# Patient Record
Sex: FEMALE | Race: WHITE | NOT HISPANIC OR LATINO | URBAN - METROPOLITAN AREA
[De-identification: names, ages, dates, MRNs, and addresses within clinical notes are randomized per-mention and may not be internally consistent; named-entity substitution may affect disease eponyms.]

---

## 2017-02-02 ENCOUNTER — COMMUNICATION - HEALTHEAST (OUTPATIENT)
Dept: FAMILY MEDICINE | Facility: CLINIC | Age: 82
End: 2017-02-02

## 2017-02-02 DIAGNOSIS — Z00.00 HEALTH CARE MAINTENANCE: ICD-10-CM

## 2017-02-02 DIAGNOSIS — I63.9 STROKE (H): ICD-10-CM

## 2017-08-14 ENCOUNTER — COMMUNICATION - HEALTHEAST (OUTPATIENT)
Dept: FAMILY MEDICINE | Facility: CLINIC | Age: 82
End: 2017-08-14

## 2017-08-15 ENCOUNTER — AMBULATORY - HEALTHEAST (OUTPATIENT)
Dept: FAMILY MEDICINE | Facility: CLINIC | Age: 82
End: 2017-08-15

## 2017-08-15 DIAGNOSIS — Z00.00 HEALTH CARE MAINTENANCE: ICD-10-CM

## 2017-08-15 DIAGNOSIS — I63.9 STROKE (H): ICD-10-CM

## 2017-08-16 ENCOUNTER — COMMUNICATION - HEALTHEAST (OUTPATIENT)
Dept: SCHEDULING | Facility: CLINIC | Age: 82
End: 2017-08-16

## 2017-08-16 DIAGNOSIS — I63.9 STROKE (H): ICD-10-CM

## 2017-08-17 ENCOUNTER — COMMUNICATION - HEALTHEAST (OUTPATIENT)
Dept: SCHEDULING | Facility: CLINIC | Age: 82
End: 2017-08-17

## 2017-08-17 DIAGNOSIS — I63.9 STROKE (H): ICD-10-CM

## 2017-08-21 ENCOUNTER — RECORDS - HEALTHEAST (OUTPATIENT)
Dept: ADMINISTRATIVE | Facility: OTHER | Age: 82
End: 2017-08-21

## 2017-09-26 ENCOUNTER — OFFICE VISIT - HEALTHEAST (OUTPATIENT)
Dept: SURGERY | Facility: CLINIC | Age: 82
End: 2017-09-26

## 2017-09-26 DIAGNOSIS — Z85.3 PERSONAL HISTORY OF BREAST CANCER: ICD-10-CM

## 2017-09-26 ASSESSMENT — MIFFLIN-ST. JEOR: SCORE: 869.89

## 2018-02-09 ENCOUNTER — COMMUNICATION - HEALTHEAST (OUTPATIENT)
Dept: SCHEDULING | Facility: CLINIC | Age: 83
End: 2018-02-09

## 2018-02-09 DIAGNOSIS — I63.9 STROKE (H): ICD-10-CM

## 2018-05-01 ENCOUNTER — COMMUNICATION - HEALTHEAST (OUTPATIENT)
Dept: FAMILY MEDICINE | Facility: CLINIC | Age: 83
End: 2018-05-01

## 2018-05-01 ENCOUNTER — OFFICE VISIT - HEALTHEAST (OUTPATIENT)
Dept: FAMILY MEDICINE | Facility: CLINIC | Age: 83
End: 2018-05-01

## 2018-05-01 DIAGNOSIS — I63.9 STROKE (H): ICD-10-CM

## 2018-05-01 DIAGNOSIS — R01.1 HEART MURMUR: ICD-10-CM

## 2018-05-01 LAB
ANION GAP SERPL CALCULATED.3IONS-SCNC: 11 MMOL/L (ref 5–18)
BUN SERPL-MCNC: 24 MG/DL (ref 8–28)
CALCIUM SERPL-MCNC: 9.4 MG/DL (ref 8.5–10.5)
CHLORIDE BLD-SCNC: 106 MMOL/L (ref 98–107)
CO2 SERPL-SCNC: 23 MMOL/L (ref 22–31)
CREAT SERPL-MCNC: 0.9 MG/DL (ref 0.6–1.1)
GFR SERPL CREATININE-BSD FRML MDRD: 59 ML/MIN/1.73M2
GLUCOSE BLD-MCNC: 100 MG/DL (ref 70–125)
POTASSIUM BLD-SCNC: 4.3 MMOL/L (ref 3.5–5)
SODIUM SERPL-SCNC: 140 MMOL/L (ref 136–145)

## 2018-05-01 ASSESSMENT — MIFFLIN-ST. JEOR: SCORE: 889.39

## 2018-05-02 ENCOUNTER — AMBULATORY - HEALTHEAST (OUTPATIENT)
Dept: FAMILY MEDICINE | Facility: CLINIC | Age: 83
End: 2018-05-02

## 2018-05-02 DIAGNOSIS — I63.9 STROKE (H): ICD-10-CM

## 2018-06-13 ENCOUNTER — HOSPITAL ENCOUNTER (OUTPATIENT)
Dept: CARDIOLOGY | Facility: HOSPITAL | Age: 83
Discharge: HOME | End: 2018-06-13
Attending: FAMILY MEDICINE

## 2018-06-13 DIAGNOSIS — R01.1 HEART MURMUR: ICD-10-CM

## 2018-06-13 ASSESSMENT — MIFFLIN-ST. JEOR: SCORE: 888.03

## 2018-06-14 ENCOUNTER — COMMUNICATION - HEALTHEAST (OUTPATIENT)
Dept: FAMILY MEDICINE | Facility: CLINIC | Age: 83
End: 2018-06-14

## 2018-06-14 LAB
AORTIC ROOT: 2.7 CM
AORTIC VALVE MEAN VELOCITY: 231 CM/S
AV DIMENSIONLESS INDEX VTI: 0.2
AV MEAN GRADIENT: 23 MMHG
AV PEAK GRADIENT: 39.2 MMHG
AV VALVE AREA: 0.7 CM2
BSA FOR ECHO PROCEDURE: 1.54 M2
CV BLOOD PRESSURE: NORMAL MMHG
CV ECHO HEIGHT: 62 IN
CV ECHO WEIGHT: 119 LBS
DOP CALC AO PEAK VEL: 313 CM/S
DOP CALC AO VTI: 73.9 CM
DOP CALC LVOT AREA: 3.14 CM2
DOP CALC LVOT DIAMETER: 2 CM
DOP CALC LVOT STROKE VOLUME: 49.9 CM3
DOP CALC MV VTI: 40.6 CM
DOP CALCLVOT PEAK VEL VTI: 15.9 CM
EJECTION FRACTION: 75 % (ref 55–75)
FRACTIONAL SHORTENING: 28.1 % (ref 28–44)
INTERVENTRICULAR SEPTUM IN END DIASTOLE: 1.8 CM (ref 0.6–0.9)
IVS/PW RATIO: 1.8
LA AREA 1: 16.2 CM2
LA AREA 2: 14.4 CM2
LEFT ATRIUM LENGTH: 5.24 CM
LEFT ATRIUM SIZE: 4 CM
LEFT ATRIUM VOLUME INDEX: 24.6 ML/M2
LEFT ATRIUM VOLUME: 37.8 ML
LEFT VENTRICLE CARDIAC INDEX: 2.4 L/MIN/M2
LEFT VENTRICLE CARDIAC OUTPUT: 3.7 L/MIN
LEFT VENTRICLE DIASTOLIC VOLUME INDEX: 33.8 CM3/M2 (ref 34–74)
LEFT VENTRICLE DIASTOLIC VOLUME: 52 CM3 (ref 46–106)
LEFT VENTRICLE HEART RATE: 74 BPM
LEFT VENTRICLE MASS INDEX: 99.4 G/M2
LEFT VENTRICLE SYSTOLIC VOLUME INDEX: 8.4 CM3/M2 (ref 11–31)
LEFT VENTRICLE SYSTOLIC VOLUME: 13 CM3 (ref 14–42)
LEFT VENTRICULAR INTERNAL DIMENSION IN DIASTOLE: 3.2 CM (ref 3.8–5.2)
LEFT VENTRICULAR INTERNAL DIMENSION IN SYSTOLE: 2.3 CM (ref 2.2–3.5)
LEFT VENTRICULAR MASS: 153 G
LEFT VENTRICULAR OUTFLOW TRACT MEAN GRADIENT: 1 MMHG
LEFT VENTRICULAR OUTFLOW TRACT MEAN VELOCITY: 47.6 CM/S
LEFT VENTRICULAR POSTERIOR WALL IN END DIASTOLE: 1 CM (ref 0.6–0.9)
LV STROKE VOLUME INDEX: 32.4 ML/M2
MITRAL REGURGITANT VELOCITY TIME INTEGRAL: 108 CM
MITRAL VALVE DECELERATION SLOPE: 3750 MM/S2
MITRAL VALVE E/A RATIO: 0.7
MITRAL VALVE MEAN INFLOW VELOCITY: 111 CM/S
MITRAL VALVE PEAK VELOCITY: 175 CM/S
MITRAL VALVE PRESSURE HALF-TIME: 102 MS
MR MEAN GRADIENT: 99 MMHG
MR MEAN VELOCITY: 469 CM/S
MR PEAK GRADIENT: 155.3 MMHG
MR PISA RADIUS: 0.7 CM
MV AREA VTI: 1.23 CM2
MV AVERAGE E/E' RATIO: 25.2 CM/S
MV DECELERATION TIME: 320 MS
MV E'TISSUE VEL-LAT: 4.39 CM/S
MV E'TISSUE VEL-MED: 3.7 CM/S
MV LATERAL E/E' RATIO: 23.2
MV MEAN GRADIENT: 5 MMHG
MV MEDIAL E/E' RATIO: 27.6
MV PEAK A VELOCITY: 156 CM/S
MV PEAK E VELOCITY: 102 CM/S
MV PEAK GRADIENT: 12.3 MMHG
MV VALVE AREA BY CONTINUITY EQUATION: 1.2 CM2
MV VALVE AREA PRESSURE 1/2 METHOD: 2.2 CM2
NUC REST DIASTOLIC VOLUME INDEX: 1904 LBS
NUC REST SYSTOLIC VOLUME INDEX: 62 IN
PISA MR PEAK VEL: 623 CM/S
PR MAX PG: 6 MMHG
PR PEAK VELOCITY: 119 CM/S
TRICUSPID REGURGITATION PEAK PRESSURE GRADIENT: 30 MMHG
TRICUSPID VALVE ANULAR PLANE SYSTOLIC EXCURSION: 1.6 CM
TRICUSPID VALVE PEAK REGURGITANT VELOCITY: 274 CM/S

## 2018-06-15 ENCOUNTER — COMMUNICATION - HEALTHEAST (OUTPATIENT)
Dept: FAMILY MEDICINE | Facility: CLINIC | Age: 83
End: 2018-06-15

## 2018-08-06 ENCOUNTER — COMMUNICATION - HEALTHEAST (OUTPATIENT)
Dept: FAMILY MEDICINE | Facility: CLINIC | Age: 83
End: 2018-08-06

## 2018-09-07 ENCOUNTER — RECORDS - HEALTHEAST (OUTPATIENT)
Dept: ADMINISTRATIVE | Facility: OTHER | Age: 83
End: 2018-09-07

## 2018-09-10 ENCOUNTER — RECORDS - HEALTHEAST (OUTPATIENT)
Dept: ADMINISTRATIVE | Facility: OTHER | Age: 83
End: 2018-09-10

## 2018-09-11 ENCOUNTER — AMBULATORY - HEALTHEAST (OUTPATIENT)
Dept: ADMINISTRATIVE | Facility: CLINIC | Age: 83
End: 2018-09-11

## 2018-09-11 ENCOUNTER — OFFICE VISIT - HEALTHEAST (OUTPATIENT)
Dept: GERIATRICS | Facility: CLINIC | Age: 83
End: 2018-09-11

## 2018-09-11 DIAGNOSIS — I10 HYPERTENSION: ICD-10-CM

## 2018-09-11 DIAGNOSIS — D72.829 LEUKOCYTOSIS: ICD-10-CM

## 2018-09-11 DIAGNOSIS — K92.2 UPPER GI BLEED: ICD-10-CM

## 2018-09-11 DIAGNOSIS — D64.9 ANEMIA: ICD-10-CM

## 2018-09-11 RX ORDER — PANTOPRAZOLE SODIUM 40 MG/1
40 TABLET, DELAYED RELEASE ORAL
Status: SHIPPED | COMMUNITY
Start: 2018-09-11

## 2018-09-13 ENCOUNTER — OFFICE VISIT - HEALTHEAST (OUTPATIENT)
Dept: GERIATRICS | Facility: CLINIC | Age: 83
End: 2018-09-13

## 2018-09-13 ENCOUNTER — RECORDS - HEALTHEAST (OUTPATIENT)
Dept: LAB | Facility: CLINIC | Age: 83
End: 2018-09-13

## 2018-09-13 DIAGNOSIS — D64.9 ANEMIA: ICD-10-CM

## 2018-09-13 DIAGNOSIS — R53.1 GENERAL WEAKNESS: ICD-10-CM

## 2018-09-13 DIAGNOSIS — K25.9 GASTRIC ULCER: ICD-10-CM

## 2018-09-13 DIAGNOSIS — I10 HYPERTENSION: ICD-10-CM

## 2018-09-13 LAB — HGB BLD-MCNC: 10.1 G/DL (ref 12–16)

## 2018-09-17 ENCOUNTER — OFFICE VISIT - HEALTHEAST (OUTPATIENT)
Dept: GERIATRICS | Facility: CLINIC | Age: 83
End: 2018-09-17

## 2018-09-17 ENCOUNTER — RECORDS - HEALTHEAST (OUTPATIENT)
Dept: LAB | Facility: CLINIC | Age: 83
End: 2018-09-17

## 2018-09-17 DIAGNOSIS — R53.1 GENERAL WEAKNESS: ICD-10-CM

## 2018-09-17 DIAGNOSIS — D64.9 ANEMIA: ICD-10-CM

## 2018-09-17 DIAGNOSIS — K25.9 GASTRIC ULCER: ICD-10-CM

## 2018-09-17 DIAGNOSIS — I10 HYPERTENSION: ICD-10-CM

## 2018-09-17 LAB — HGB BLD-MCNC: 11.6 G/DL (ref 12–16)

## 2018-09-20 ENCOUNTER — OFFICE VISIT - HEALTHEAST (OUTPATIENT)
Dept: GERIATRICS | Facility: CLINIC | Age: 83
End: 2018-09-20

## 2018-09-20 DIAGNOSIS — I10 HYPERTENSION: ICD-10-CM

## 2018-09-20 DIAGNOSIS — R53.1 GENERAL WEAKNESS: ICD-10-CM

## 2018-09-20 DIAGNOSIS — D64.9 ANEMIA: ICD-10-CM

## 2018-09-20 DIAGNOSIS — K25.9 GASTRIC ULCER: ICD-10-CM

## 2018-09-24 ENCOUNTER — OFFICE VISIT - HEALTHEAST (OUTPATIENT)
Dept: GERIATRICS | Facility: CLINIC | Age: 83
End: 2018-09-24

## 2018-09-24 DIAGNOSIS — K25.9 GASTRIC ULCER: ICD-10-CM

## 2018-09-24 DIAGNOSIS — I10 HYPERTENSION: ICD-10-CM

## 2018-09-24 DIAGNOSIS — H40.9 GLAUCOMA: ICD-10-CM

## 2018-09-24 DIAGNOSIS — D64.9 ANEMIA: ICD-10-CM

## 2018-09-25 ENCOUNTER — COMMUNICATION - HEALTHEAST (OUTPATIENT)
Dept: FAMILY MEDICINE | Facility: CLINIC | Age: 83
End: 2018-09-25

## 2018-09-27 ENCOUNTER — COMMUNICATION - HEALTHEAST (OUTPATIENT)
Dept: GERIATRICS | Facility: CLINIC | Age: 83
End: 2018-09-27

## 2018-09-27 ENCOUNTER — AMBULATORY - HEALTHEAST (OUTPATIENT)
Dept: GERIATRICS | Facility: CLINIC | Age: 83
End: 2018-09-27

## 2018-10-10 ENCOUNTER — RECORDS - HEALTHEAST (OUTPATIENT)
Dept: ADMINISTRATIVE | Facility: OTHER | Age: 83
End: 2018-10-10

## 2018-10-11 ENCOUNTER — COMMUNICATION - HEALTHEAST (OUTPATIENT)
Dept: FAMILY MEDICINE | Facility: CLINIC | Age: 83
End: 2018-10-11

## 2018-10-12 ENCOUNTER — OFFICE VISIT - HEALTHEAST (OUTPATIENT)
Dept: FAMILY MEDICINE | Facility: CLINIC | Age: 83
End: 2018-10-12

## 2018-10-12 DIAGNOSIS — I10 HYPERTENSION: ICD-10-CM

## 2018-10-12 DIAGNOSIS — Z01.818 ENCOUNTER FOR PREOPERATIVE EXAMINATION FOR GENERAL SURGICAL PROCEDURE: ICD-10-CM

## 2018-10-12 DIAGNOSIS — I63.9 STROKE (H): ICD-10-CM

## 2018-10-12 DIAGNOSIS — K92.2 GI BLEED: ICD-10-CM

## 2018-10-12 DIAGNOSIS — H26.9 CATARACT: ICD-10-CM

## 2018-10-12 LAB
ALBUMIN SERPL-MCNC: 3.9 G/DL (ref 3.5–5)
ALP SERPL-CCNC: 175 U/L (ref 45–120)
ALT SERPL W P-5'-P-CCNC: 14 U/L (ref 0–45)
ANION GAP SERPL CALCULATED.3IONS-SCNC: 15 MMOL/L (ref 5–18)
AST SERPL W P-5'-P-CCNC: 22 U/L (ref 0–40)
BASOPHILS # BLD AUTO: 0.1 THOU/UL (ref 0–0.2)
BASOPHILS NFR BLD AUTO: 1 % (ref 0–2)
BILIRUB SERPL-MCNC: 0.5 MG/DL (ref 0–1)
BUN SERPL-MCNC: 24 MG/DL (ref 8–28)
CALCIUM SERPL-MCNC: 9.7 MG/DL (ref 8.5–10.5)
CHLORIDE BLD-SCNC: 104 MMOL/L (ref 98–107)
CO2 SERPL-SCNC: 22 MMOL/L (ref 22–31)
CREAT SERPL-MCNC: 1.05 MG/DL (ref 0.6–1.1)
EOSINOPHIL # BLD AUTO: 0.2 THOU/UL (ref 0–0.4)
EOSINOPHIL NFR BLD AUTO: 2 % (ref 0–6)
ERYTHROCYTE [DISTWIDTH] IN BLOOD BY AUTOMATED COUNT: 14.3 % (ref 11–14.5)
GFR SERPL CREATININE-BSD FRML MDRD: 49 ML/MIN/1.73M2
GLUCOSE BLD-MCNC: 92 MG/DL (ref 70–125)
HCT VFR BLD AUTO: 37.3 % (ref 35–47)
HGB BLD-MCNC: 12.4 G/DL (ref 12–16)
LYMPHOCYTES # BLD AUTO: 1.6 THOU/UL (ref 0.8–4.4)
LYMPHOCYTES NFR BLD AUTO: 14 % (ref 20–40)
MCH RBC QN AUTO: 27.6 PG (ref 27–34)
MCHC RBC AUTO-ENTMCNC: 33.3 G/DL (ref 32–36)
MCV RBC AUTO: 83 FL (ref 80–100)
MONOCYTES # BLD AUTO: 0.6 THOU/UL (ref 0–0.9)
MONOCYTES NFR BLD AUTO: 5 % (ref 2–10)
NEUTROPHILS # BLD AUTO: 9 THOU/UL (ref 2–7.7)
NEUTROPHILS NFR BLD AUTO: 79 % (ref 50–70)
PLATELET # BLD AUTO: 335 THOU/UL (ref 140–440)
PMV BLD AUTO: 8.3 FL (ref 7–10)
POTASSIUM BLD-SCNC: 4.2 MMOL/L (ref 3.5–5)
PROT SERPL-MCNC: 6.6 G/DL (ref 6–8)
RBC # BLD AUTO: 4.49 MILL/UL (ref 3.8–5.4)
SODIUM SERPL-SCNC: 141 MMOL/L (ref 136–145)
WBC: 11.5 THOU/UL (ref 4–11)

## 2018-10-12 ASSESSMENT — MIFFLIN-ST. JEOR: SCORE: 820.9

## 2018-10-13 ENCOUNTER — HOME CARE/HOSPICE - HEALTHEAST (OUTPATIENT)
Dept: HOME HEALTH SERVICES | Facility: HOME HEALTH | Age: 83
End: 2018-10-13

## 2018-10-13 ENCOUNTER — COMMUNICATION - HEALTHEAST (OUTPATIENT)
Dept: HOME HEALTH SERVICES | Facility: HOME HEALTH | Age: 83
End: 2018-10-13

## 2018-10-13 LAB
ATRIAL RATE - MUSE: 95 BPM
DIASTOLIC BLOOD PRESSURE - MUSE: NORMAL MMHG
INTERPRETATION ECG - MUSE: NORMAL
P AXIS - MUSE: 48 DEGREES
PR INTERVAL - MUSE: 124 MS
QRS DURATION - MUSE: 84 MS
QT - MUSE: 370 MS
QTC - MUSE: 464 MS
R AXIS - MUSE: 18 DEGREES
SYSTOLIC BLOOD PRESSURE - MUSE: NORMAL MMHG
T AXIS - MUSE: 56 DEGREES
VENTRICULAR RATE- MUSE: 95 BPM

## 2018-10-15 ENCOUNTER — COMMUNICATION - HEALTHEAST (OUTPATIENT)
Dept: FAMILY MEDICINE | Facility: CLINIC | Age: 83
End: 2018-10-15

## 2018-10-15 ENCOUNTER — AMBULATORY - HEALTHEAST (OUTPATIENT)
Dept: FAMILY MEDICINE | Facility: CLINIC | Age: 83
End: 2018-10-15

## 2018-10-15 ENCOUNTER — RECORDS - HEALTHEAST (OUTPATIENT)
Dept: ADMINISTRATIVE | Facility: OTHER | Age: 83
End: 2018-10-15

## 2018-10-15 DIAGNOSIS — N28.9 FUNCTION KIDNEY DECREASED: ICD-10-CM

## 2018-10-22 ENCOUNTER — COMMUNICATION - HEALTHEAST (OUTPATIENT)
Dept: FAMILY MEDICINE | Facility: CLINIC | Age: 83
End: 2018-10-22

## 2018-10-23 ENCOUNTER — RECORDS - HEALTHEAST (OUTPATIENT)
Dept: ADMINISTRATIVE | Facility: OTHER | Age: 83
End: 2018-10-23

## 2018-10-29 ENCOUNTER — AMBULATORY - HEALTHEAST (OUTPATIENT)
Dept: LAB | Facility: CLINIC | Age: 83
End: 2018-10-29

## 2018-10-29 DIAGNOSIS — N28.9 FUNCTION KIDNEY DECREASED: ICD-10-CM

## 2018-10-29 LAB
ANION GAP SERPL CALCULATED.3IONS-SCNC: 14 MMOL/L (ref 5–18)
BUN SERPL-MCNC: 28 MG/DL (ref 8–28)
CALCIUM SERPL-MCNC: 10.1 MG/DL (ref 8.5–10.5)
CHLORIDE BLD-SCNC: 104 MMOL/L (ref 98–107)
CO2 SERPL-SCNC: 24 MMOL/L (ref 22–31)
CREAT SERPL-MCNC: 1.31 MG/DL (ref 0.6–1.1)
GFR SERPL CREATININE-BSD FRML MDRD: 38 ML/MIN/1.73M2
GLUCOSE BLD-MCNC: 88 MG/DL (ref 70–125)
POTASSIUM BLD-SCNC: 4.2 MMOL/L (ref 3.5–5)
SODIUM SERPL-SCNC: 142 MMOL/L (ref 136–145)

## 2018-11-06 ENCOUNTER — RECORDS - HEALTHEAST (OUTPATIENT)
Dept: ADMINISTRATIVE | Facility: OTHER | Age: 83
End: 2018-11-06

## 2018-11-07 ENCOUNTER — COMMUNICATION - HEALTHEAST (OUTPATIENT)
Dept: FAMILY MEDICINE | Facility: CLINIC | Age: 83
End: 2018-11-07

## 2018-11-15 ENCOUNTER — COMMUNICATION - HEALTHEAST (OUTPATIENT)
Dept: SCHEDULING | Facility: CLINIC | Age: 83
End: 2018-11-15

## 2018-11-15 DIAGNOSIS — I63.9 STROKE (H): ICD-10-CM

## 2018-11-15 DIAGNOSIS — Z00.00 HEALTH CARE MAINTENANCE: ICD-10-CM

## 2018-11-15 RX ORDER — ZINC GLUCONATE 50 MG
50 TABLET ORAL DAILY
Refills: 0 | Status: SHIPPED | COMMUNITY
Start: 2018-11-15

## 2018-11-19 ENCOUNTER — OFFICE VISIT - HEALTHEAST (OUTPATIENT)
Dept: FAMILY MEDICINE | Facility: CLINIC | Age: 83
End: 2018-11-19

## 2018-11-19 DIAGNOSIS — I63.9 CEREBROVASCULAR ACCIDENT (CVA), UNSPECIFIED MECHANISM (H): ICD-10-CM

## 2018-11-19 DIAGNOSIS — H40.9 GLAUCOMA, UNSPECIFIED GLAUCOMA TYPE, UNSPECIFIED LATERALITY: ICD-10-CM

## 2018-11-19 DIAGNOSIS — Z01.818 PRE-OPERATIVE EXAMINATION: ICD-10-CM

## 2018-11-19 DIAGNOSIS — D64.9 ANEMIA, UNSPECIFIED TYPE: ICD-10-CM

## 2018-11-19 DIAGNOSIS — I10 ESSENTIAL HYPERTENSION: ICD-10-CM

## 2018-11-19 DIAGNOSIS — H26.9 CATARACT OF RIGHT EYE, UNSPECIFIED CATARACT TYPE: ICD-10-CM

## 2018-11-19 DIAGNOSIS — I35.0 AORTIC STENOSIS, MODERATE: ICD-10-CM

## 2018-11-19 ASSESSMENT — MIFFLIN-ST. JEOR: SCORE: 825.43

## 2018-12-04 ENCOUNTER — COMMUNICATION - HEALTHEAST (OUTPATIENT)
Dept: FAMILY MEDICINE | Facility: CLINIC | Age: 83
End: 2018-12-04

## 2018-12-04 DIAGNOSIS — I63.9 STROKE (H): ICD-10-CM

## 2018-12-04 DIAGNOSIS — Z00.00 HEALTH CARE MAINTENANCE: ICD-10-CM

## 2019-01-31 ENCOUNTER — COMMUNICATION - HEALTHEAST (OUTPATIENT)
Dept: FAMILY MEDICINE | Facility: CLINIC | Age: 84
End: 2019-01-31

## 2019-02-01 ENCOUNTER — COMMUNICATION - HEALTHEAST (OUTPATIENT)
Dept: FAMILY MEDICINE | Facility: CLINIC | Age: 84
End: 2019-02-01

## 2019-02-01 ENCOUNTER — HOME CARE/HOSPICE - HEALTHEAST (OUTPATIENT)
Dept: HOME HEALTH SERVICES | Facility: HOME HEALTH | Age: 84
End: 2019-02-01

## 2019-02-01 ENCOUNTER — COMMUNICATION - HEALTHEAST (OUTPATIENT)
Dept: HOME HEALTH SERVICES | Facility: HOME HEALTH | Age: 84
End: 2019-02-01

## 2019-02-01 ENCOUNTER — OFFICE VISIT - HEALTHEAST (OUTPATIENT)
Dept: FAMILY MEDICINE | Facility: CLINIC | Age: 84
End: 2019-02-01

## 2019-02-01 ENCOUNTER — RECORDS - HEALTHEAST (OUTPATIENT)
Dept: GENERAL RADIOLOGY | Facility: CLINIC | Age: 84
End: 2019-02-01
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
## 2019-02-01 DIAGNOSIS — I63.9 STROKE (H): ICD-10-CM
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
## 2019-02-02 ENCOUNTER — HOME CARE/HOSPICE - HEALTHEAST (OUTPATIENT)
Dept: HOME HEALTH SERVICES | Facility: HOME HEALTH | Age: 84
End: 2019-02-02

## 2019-02-04 ENCOUNTER — RECORDS - HEALTHEAST (OUTPATIENT)
Dept: ADMINISTRATIVE | Facility: OTHER | Age: 84
End: 2019-02-04

## 2019-02-04 ENCOUNTER — COMMUNICATION - HEALTHEAST (OUTPATIENT)
Dept: FAMILY MEDICINE | Facility: CLINIC | Age: 84
End: 2019-02-04

## 2019-02-04 ENCOUNTER — HOME CARE/HOSPICE - HEALTHEAST (OUTPATIENT)
Dept: HOME HEALTH SERVICES | Facility: HOME HEALTH | Age: 84
End: 2019-02-04

## 2019-02-05 ENCOUNTER — HOME CARE/HOSPICE - HEALTHEAST (OUTPATIENT)
Dept: HOME HEALTH SERVICES | Facility: HOME HEALTH | Age: 84
End: 2019-02-05

## 2019-02-06 ENCOUNTER — HOME CARE/HOSPICE - HEALTHEAST (OUTPATIENT)
Dept: HOME HEALTH SERVICES | Facility: HOME HEALTH | Age: 84
End: 2019-02-06

## 2019-02-07 ENCOUNTER — COMMUNICATION - HEALTHEAST (OUTPATIENT)
Dept: OCCUPATIONAL THERAPY | Facility: CLINIC | Age: 84
End: 2019-02-07

## 2019-02-07 ENCOUNTER — HOME CARE/HOSPICE - HEALTHEAST (OUTPATIENT)
Dept: HOME HEALTH SERVICES | Facility: HOME HEALTH | Age: 84
End: 2019-02-07

## 2019-02-08 ENCOUNTER — HOME CARE/HOSPICE - HEALTHEAST (OUTPATIENT)
Dept: HOME HEALTH SERVICES | Facility: HOME HEALTH | Age: 84
End: 2019-02-08

## 2019-02-11 ENCOUNTER — HOME CARE/HOSPICE - HEALTHEAST (OUTPATIENT)
Dept: HOME HEALTH SERVICES | Facility: HOME HEALTH | Age: 84
End: 2019-02-11

## 2019-02-14 ENCOUNTER — HOME CARE/HOSPICE - HEALTHEAST (OUTPATIENT)
Dept: HOME HEALTH SERVICES | Facility: HOME HEALTH | Age: 84
End: 2019-02-14

## 2019-02-15 ENCOUNTER — COMMUNICATION - HEALTHEAST (OUTPATIENT)
Dept: HOME HEALTH SERVICES | Facility: HOME HEALTH | Age: 84
End: 2019-02-15

## 2019-02-15 ENCOUNTER — HOME CARE/HOSPICE - HEALTHEAST (OUTPATIENT)
Dept: HOME HEALTH SERVICES | Facility: HOME HEALTH | Age: 84
End: 2019-02-15

## 2019-02-17 ENCOUNTER — HOME CARE/HOSPICE - HEALTHEAST (OUTPATIENT)
Dept: HOME HEALTH SERVICES | Facility: HOME HEALTH | Age: 84
End: 2019-02-17

## 2019-02-19 ENCOUNTER — HOME CARE/HOSPICE - HEALTHEAST (OUTPATIENT)
Dept: HOME HEALTH SERVICES | Facility: HOME HEALTH | Age: 84
End: 2019-02-19

## 2019-02-21 ENCOUNTER — HOME CARE/HOSPICE - HEALTHEAST (OUTPATIENT)
Dept: HOME HEALTH SERVICES | Facility: HOME HEALTH | Age: 84
End: 2019-02-21

## 2019-02-22 ENCOUNTER — HOME CARE/HOSPICE - HEALTHEAST (OUTPATIENT)
Dept: HOME HEALTH SERVICES | Facility: HOME HEALTH | Age: 84
End: 2019-02-22

## 2019-02-25 ENCOUNTER — HOME CARE/HOSPICE - HEALTHEAST (OUTPATIENT)
Dept: HOME HEALTH SERVICES | Facility: HOME HEALTH | Age: 84
End: 2019-02-25

## 2019-02-26 ENCOUNTER — HOME CARE/HOSPICE - HEALTHEAST (OUTPATIENT)
Dept: HOME HEALTH SERVICES | Facility: HOME HEALTH | Age: 84
End: 2019-02-26

## 2019-02-28 ENCOUNTER — HOME CARE/HOSPICE - HEALTHEAST (OUTPATIENT)
Dept: HOME HEALTH SERVICES | Facility: HOME HEALTH | Age: 84
End: 2019-02-28

## 2019-03-01 ENCOUNTER — HOME CARE/HOSPICE - HEALTHEAST (OUTPATIENT)
Dept: HOME HEALTH SERVICES | Facility: HOME HEALTH | Age: 84
End: 2019-03-01

## 2019-03-02 ENCOUNTER — HOME CARE/HOSPICE - HEALTHEAST (OUTPATIENT)
Dept: HOME HEALTH SERVICES | Facility: HOME HEALTH | Age: 84
End: 2019-03-02

## 2019-03-04 ENCOUNTER — HOME CARE/HOSPICE - HEALTHEAST (OUTPATIENT)
Dept: HOME HEALTH SERVICES | Facility: HOME HEALTH | Age: 84
End: 2019-03-04

## 2019-03-07 ENCOUNTER — HOME CARE/HOSPICE - HEALTHEAST (OUTPATIENT)
Dept: HOME HEALTH SERVICES | Facility: HOME HEALTH | Age: 84
End: 2019-03-07

## 2019-03-07 ENCOUNTER — COMMUNICATION - HEALTHEAST (OUTPATIENT)
Dept: OCCUPATIONAL THERAPY | Facility: CLINIC | Age: 84
End: 2019-03-07

## 2019-03-11 ENCOUNTER — HOME CARE/HOSPICE - HEALTHEAST (OUTPATIENT)
Dept: HOME HEALTH SERVICES | Facility: HOME HEALTH | Age: 84
End: 2019-03-11

## 2019-03-12 ENCOUNTER — COMMUNICATION - HEALTHEAST (OUTPATIENT)
Dept: OCCUPATIONAL THERAPY | Facility: CLINIC | Age: 84
End: 2019-03-12

## 2019-03-13 ENCOUNTER — HOME CARE/HOSPICE - HEALTHEAST (OUTPATIENT)
Dept: HOME HEALTH SERVICES | Facility: HOME HEALTH | Age: 84
End: 2019-03-13

## 2019-03-14 ENCOUNTER — HOME CARE/HOSPICE - HEALTHEAST (OUTPATIENT)
Dept: HOME HEALTH SERVICES | Facility: HOME HEALTH | Age: 84
End: 2019-03-14

## 2019-03-15 ENCOUNTER — HOME CARE/HOSPICE - HEALTHEAST (OUTPATIENT)
Dept: HOME HEALTH SERVICES | Facility: HOME HEALTH | Age: 84
End: 2019-03-15

## 2019-03-19 ENCOUNTER — HOME CARE/HOSPICE - HEALTHEAST (OUTPATIENT)
Dept: HOME HEALTH SERVICES | Facility: HOME HEALTH | Age: 84
End: 2019-03-19

## 2019-03-20 ENCOUNTER — HOME CARE/HOSPICE - HEALTHEAST (OUTPATIENT)
Dept: HOME HEALTH SERVICES | Facility: HOME HEALTH | Age: 84
End: 2019-03-20

## 2019-03-21 ENCOUNTER — HOME CARE/HOSPICE - HEALTHEAST (OUTPATIENT)
Dept: HOME HEALTH SERVICES | Facility: HOME HEALTH | Age: 84
End: 2019-03-21

## 2019-03-21 ENCOUNTER — COMMUNICATION - HEALTHEAST (OUTPATIENT)
Dept: HOME HEALTH SERVICES | Facility: HOME HEALTH | Age: 84
End: 2019-03-21

## 2019-03-25 ENCOUNTER — HOME CARE/HOSPICE - HEALTHEAST (OUTPATIENT)
Dept: HOME HEALTH SERVICES | Facility: HOME HEALTH | Age: 84
End: 2019-03-25

## 2019-03-26 ENCOUNTER — COMMUNICATION - HEALTHEAST (OUTPATIENT)
Dept: OTHER | Facility: HOME HEALTH | Age: 84
End: 2019-03-26

## 2019-03-28 ENCOUNTER — HOME CARE/HOSPICE - HEALTHEAST (OUTPATIENT)
Dept: HOME HEALTH SERVICES | Facility: HOME HEALTH | Age: 84
End: 2019-03-28

## 2019-04-01 ENCOUNTER — HOME CARE/HOSPICE - HEALTHEAST (OUTPATIENT)
Dept: HOME HEALTH SERVICES | Facility: HOME HEALTH | Age: 84
End: 2019-04-01

## 2019-04-04 ENCOUNTER — HOME CARE/HOSPICE - HEALTHEAST (OUTPATIENT)
Dept: HOME HEALTH SERVICES | Facility: HOME HEALTH | Age: 84
End: 2019-04-04

## 2019-06-14 ENCOUNTER — COMMUNICATION - HEALTHEAST (OUTPATIENT)
Dept: FAMILY MEDICINE | Facility: CLINIC | Age: 84
End: 2019-06-14

## 2019-06-14 ENCOUNTER — OFFICE VISIT - HEALTHEAST (OUTPATIENT)
Dept: FAMILY MEDICINE | Facility: CLINIC | Age: 84
End: 2019-06-14

## 2019-06-14 ENCOUNTER — AMBULATORY - HEALTHEAST (OUTPATIENT)
Dept: FAMILY MEDICINE | Facility: CLINIC | Age: 84
End: 2019-06-14

## 2019-06-14 DIAGNOSIS — N18.30 CKD (CHRONIC KIDNEY DISEASE) STAGE 3, GFR 30-59 ML/MIN (H): ICD-10-CM

## 2019-06-14 DIAGNOSIS — Z01.818 PRE-OPERATIVE EXAMINATION: ICD-10-CM

## 2019-06-14 DIAGNOSIS — I35.0 AORTIC STENOSIS, MODERATE: ICD-10-CM

## 2019-06-14 DIAGNOSIS — I63.9 CEREBROVASCULAR ACCIDENT (CVA), UNSPECIFIED MECHANISM (H): ICD-10-CM

## 2019-06-14 DIAGNOSIS — I10 ESSENTIAL HYPERTENSION: ICD-10-CM

## 2019-06-14 DIAGNOSIS — H59.021: ICD-10-CM

## 2019-06-14 RX ORDER — SUCRALFATE ORAL 1 G/10ML
1 SUSPENSION ORAL 2 TIMES DAILY
Status: SHIPPED | COMMUNITY
Start: 2019-06-14

## 2019-08-15 ENCOUNTER — RECORDS - HEALTHEAST (OUTPATIENT)
Dept: ADMINISTRATIVE | Facility: OTHER | Age: 84
End: 2019-08-15

## 2019-08-23 ENCOUNTER — COMMUNICATION - HEALTHEAST (OUTPATIENT)
Dept: SURGERY | Facility: CLINIC | Age: 84
End: 2019-08-23

## 2019-09-09 ENCOUNTER — COMMUNICATION - HEALTHEAST (OUTPATIENT)
Dept: FAMILY MEDICINE | Facility: CLINIC | Age: 84
End: 2019-09-09

## 2019-09-10 ENCOUNTER — COMMUNICATION - HEALTHEAST (OUTPATIENT)
Dept: FAMILY MEDICINE | Facility: CLINIC | Age: 84
End: 2019-09-10

## 2021-05-28 ENCOUNTER — RECORDS - HEALTHEAST (OUTPATIENT)
Dept: ADMINISTRATIVE | Facility: CLINIC | Age: 86
End: 2021-05-28

## 2021-05-29 NOTE — TELEPHONE ENCOUNTER
Patient states she has not been taking Lisinopril for quite a while now.      See ov note from 2-1-19 below and advise.      DIAGNOSIS:  1. Fall, initial encounter  Ambulatory referral to Home Health     XR Pelvis W 2 Vw Hips Bilateral   2. Stroke (H)  lisinopril (PRINIVIL,ZESTRIL) 10 MG tablet     Ambulatory referral to Home Health         PLAN:  Patient Instructions   reduce lisinopril to 5 mg daily     Recommend homecare eval and home pt     Home care eval and need for home PT     Also would recommend 24 HOUR PCA at home                       HPI:   Fell on 1-20-19 on 1:20am while bending to  something off the floor.  Standing with her walker and fell. Hit the wall with her right hand.   Slid down the wall to the floor.  So was on the floor from 1:30am to 8:30am.   Had taken off alert bracelet so was unable to summon help.  Lives on one level in her home and never goes upstairs.  Daughter has been with her since the fall.  No pain any place.  Not lightheaded or dizzy.  States no pain on feet but doesn't feel steady.  A friend is here with her today and has been staying with her and providing care 24 hour.                     Current Outpatient Medications on File Prior to Visit   Medication Sig Dispense Refill     cholecalciferol, vitamin D3, (CHOLECALCIFEROL) 1,000 unit tablet Take 1 tablet (1,000 Units total) by mouth daily. 90 tablet 1     methyl salicylate-menthol (MEGAN ELIZALDE GREASELESS) 15-10 % Crea Apply once daily as needed to sore muscles of the back.   0     pantoprazole (PROTONIX) 40 MG tablet Take 40 mg by mouth 2 (two) times a day before meals.         ZINC ORAL Take 1 tablet by mouth.         [DISCONTINUED] lisinopril (PRINIVIL,ZESTRIL) 10 MG tablet Take 1 tablet (10 mg total) by mouth daily. 90 tablet 3     aspirin 81 MG EC tablet Take 1 tablet (81 mg total) by mouth daily. 90 tablet 1     diphenhydrAMINE (BENADRYL) 25 mg capsule Take 25 mg by mouth every 6 (six) hours as needed for itching.          dorzolamide-timolol (COSOPT) 22.3-6.8 mg/mL ophthalmic solution Administer 1 drop to both eyes 2 (two) times a day.         latanoprost (XALATAN) 0.005 % ophthalmic solution Administer 1 drop to both eyes bedtime.         zinc gluconate 50 mg tablet Take 1 tablet (50 mg total) by mouth daily.

## 2021-05-29 NOTE — TELEPHONE ENCOUNTER
Patient informed.  She has not been taking Lisinopril, so she will remain off of the medication..

## 2021-05-29 NOTE — TELEPHONE ENCOUNTER
Medication Question or Clarification  Who is calling: Patient  What medication are you calling about? (include dose and sig)   lisinopril (PRINIVIL,ZESTRIL) 10 MG tablet 90 tablet 3 2/1/2019     Sig - Route: Take 0.5 tablets (5 mg total) by mouth daily. - Oral    Class: No Print        Who prescribed the medication?: Dr Wu  What is your question/concern?: Patient states she has not been taking this medication for a while now. States she was told to stop taking it as her blood pressures where too low. Patient can not remember who or when she was told this, but she has not been taking it.  Please advise.    Pharmacy: [facility]  Okay to leave a detailed message?: Yes  Site CMT - Please call the pharmacy to obtain any additional needed information.

## 2021-05-29 NOTE — PROGRESS NOTES
Preoperative Exam    Scheduled Procedure: Right Cataract Surgery  Surgery Date: 07/03/19  Surgery Location: [facility]  Surgeon: Dr. Medina    Assessment/Plan:     1. Pre-operative examination      2. Cataract fragments of right eye following cataract surgery      3. Cerebrovascular accident (CVA), unspecified mechanism (H)      4. Essential hypertension      5. Aortic stenosis, moderate    6.  h/o bleeding ulcer, stable without recurrence on PPI    7. CPK 3      Surgical Procedure Risk: Low (reported cardiac risk generally < 1%)  Have you had prior anesthesia?: Yes  Have you or any family members had a previous anesthesia reaction:  No  Do you or any family members have a history of a clotting or bleeding disorder?: No  Cardiac Risk Assessment: no increased risk for major cardiac complications    Patient approved for surgery required or local anesthesia.      Functio nal Status: Partially Dependent:   Patient plans to recover at home with family.      Subjective:      Ciarra Lundy is a 89 y.o. female who presents for a preoperative consultation.      All other systems reviewed and are negative, other than those listed in the HPI.    Pertinent History  Do you have difficulty breathing or chest pain after walking up a flight of stairs: No  History of obstructive sleep apnea: No  Steroid use in the last 6 months: No  Frequent Aspirin/NSAID use: No  Prior Blood Transfusion: Yes:   Prior Blood Transfusion Reaction: No  If for some reason prior to, during or after the procedure, if it is medically indicated, would you be willing to have a blood transfusion?:  There is no transfusion refusal.    Current Outpatient Medications   Medication Sig Dispense Refill     cholecalciferol, vitamin D3, (CHOLECALCIFEROL) 1,000 unit tablet Take 1 tablet (1,000 Units total) by mouth daily. 90 tablet 1     dorzolamide-timolol (COSOPT) 22.3-6.8 mg/mL ophthalmic solution Administer 1 drop to both  eyes 2 (two) times a day.       latanoprost (XALATAN) 0.005 % ophthalmic solution Administer 1 drop to both eyes bedtime.       pantoprazole (PROTONIX) 40 MG tablet Take 40 mg by mouth 2 (two) times a day before meals.       sucralfate (CARAFATE) 100 mg/mL suspension Take 1 g by mouth 2 (two) times a day.       zinc gluconate 50 mg tablet Take 1 tablet (50 mg total) by mouth daily.  0     No current facility-administered medications for this visit.         Allergies   Allergen Reactions     Aspirin Nausea Only       Patient Active Problem List   Diagnosis     Glaucoma     Breast Neoplasm     Stroke (H)     Anemia     Hypertension     Gastric ulcer     Aortic stenosis, moderate     CKD (chronic kidney disease) stage 3, GFR 30-59 ml/min (H)       Past Medical History:   Diagnosis Date     Acute ischemic stroke (H)      Anemia associated with acute blood loss      Breast cancer (H)      Family history of malignant hyperthermia      Glaucoma      HTN (hypertension)      Melena      Upper GI bleeding        Past Surgical History:   Procedure Laterality Date     BREAST LUMPECTOMY Right 10/31/2013     BREAST LUMPECTOMY Right 05/26/2005     CT EXCISE BREAST CYST      Description: Breast Surgery Lumpectomy;  Recorded: 07/27/2010;  Comments: RIGHT BREAST,  2005     UPPER GASTROINTESTINAL ENDOSCOPY  09/08/2018       Social History     Socioeconomic History     Marital status:      Spouse name: Not on file     Number of children: Not on file     Years of education: Not on file     Highest education level: Not on file   Occupational History     Not on file   Social Needs     Financial resource strain: Not on file     Food insecurity:     Worry: Not on file     Inability: Not on file     Transportation needs:     Medical: Not on file     Non-medical: Not on file   Tobacco Use     Smoking status: Former Smoker     Smokeless tobacco: Never Used   Substance and Sexual Activity     Alcohol use: No     Drug use: No     Sexual  activity: Not on file   Lifestyle     Physical activity:     Days per week: Not on file     Minutes per session: Not on file     Stress: Not on file   Relationships     Social connections:     Talks on phone: Not on file     Gets together: Not on file     Attends Evangelical service: Not on file     Active member of club or organization: Not on file     Attends meetings of clubs or organizations: Not on file     Relationship status: Not on file     Intimate partner violence:     Fear of current or ex partner: Not on file     Emotionally abused: Not on file     Physically abused: Not on file     Forced sexual activity: Not on file   Other Topics Concern     Not on file   Social History Narrative     Not on file       Patient Care Team:  Kain Wu MD as PCP - General (Family Medicine)    12 system review:    Decreased hearing  Changes in vision  Hoarseness   Shortness of breath if she walks too fast  Constipation if she doesn't get her fruit every day.  Had an ulcer last summer and had to get a blood transfusion.       Objective:     Vitals:    06/14/19 0857   BP: 113/83   Pulse: 67   Resp: 20   Temp: 96.8  F (36  C)   TempSrc: Oral   Weight: (!) 89 lb 9.6 oz (40.6 kg)         Physical Exam:    GEN:  ALERT, ORIENTED TIMES THREE, NO APPARENT DISTRESS  HEENT:  TM'S NL                  PERRL                  THROAT CLEAR  NECK: SUPPLE WITHOUT ADENOPATHY, THYROMEGALY OR CAROTID BRUIT  LUNGS:  CTA  COR:  RRR WITH GRADE 2/6 CESAR RIGHT 2ND ICS  ABDOMEN: SOFT, POSITIVE BOWEL SOUNDS, NONTX WITHOUT MASS  EXT: NO CYANOSIS, CLUBBING OR EDEMA  SKIN:  NO ATYPICAL APPEARING SKIN LESIONS    Lab Results   Component Value Date    WBC 11.5 (H) 10/12/2018    HGB 12.4 10/12/2018    HCT 37.3 10/12/2018    MCV 83 10/12/2018     10/12/2018     Results for orders placed or performed in visit on 10/29/18   Basic Metabolic Panel   Result Value Ref Range    Sodium 142 136 - 145 mmol/L    Potassium 4.2 3.5 - 5.0 mmol/L     Chloride 104 98 - 107 mmol/L    CO2 24 22 - 31 mmol/L    Anion Gap, Calculation 14 5 - 18 mmol/L    Glucose 88 70 - 125 mg/dL    Calcium 10.1 8.5 - 10.5 mg/dL    BUN 28 8 - 28 mg/dL    Creatinine 1.31 (H) 0.60 - 1.10 mg/dL    GFR MDRD Af Amer 46 (L) >60 mL/min/1.73m2    GFR MDRD Non Af Amer 38 (L) >60 mL/min/1.73m2           There are no Patient Instructions on file for this visit.    Labs:  Labs pending at this time.  Results will be reviewed when available.      There is no immunization history on file for this patient.    Echo Complete   Order# [id number]   Reading physician: Lokesh Acosta MD Ordering physician: Kain Wu MD Study date: 18   Performing Date Performing Department   2018  CARDIAC TESTING [[id number]]   Patient Information     Patient Name  Ciarra Lundy MRN  [id number] Sex  Female              Age  1926 (89 y.o.)   Indications     Dx: Heart murmur [R01.1 (ICD-10-CM)]   Summary       The calculated left ventricular ejection fraction is 75%.    Moderate calcific aortic stenosis.    Moderate tricuspid valve regurgitation. No pulmonary hypertension is present.    Moderate mitral regurgitation and mild calcific stenosis.    No previous study for comparison.         Electronically signed by Kain Wu MD 19 9:01 AM

## 2021-05-31 VITALS — BODY MASS INDEX: 21.16 KG/M2 | HEIGHT: 62 IN | WEIGHT: 115 LBS

## 2021-06-01 VITALS — BODY MASS INDEX: 21.95 KG/M2 | WEIGHT: 119.3 LBS | HEIGHT: 62 IN

## 2021-06-01 VITALS — WEIGHT: 119 LBS | HEIGHT: 62 IN | BODY MASS INDEX: 21.9 KG/M2

## 2021-06-01 NOTE — TELEPHONE ENCOUNTER
Reason contacted:  Message from PCP  Information relayed: Spoke to patient's daughter Hodan and relayed message below from Dr. Wu.  Hodan voiced understanding, requesting this information to be sent via "[employer].  Message sent.  Additional questions:  No  Further follow-up needed:  No

## 2021-06-01 NOTE — TELEPHONE ENCOUNTER
Who is calling:  Hodan, patient's daughter  Reason for Call:  Patient has been moved to [place], [place] to live with Hodan.  Hodan has questions about the medication that Ciarra brought with her.    1) Pantoprazole 40 mg, one capsule twice a day with meals.  2) Vit D3 1,000 iu daily  3) Zinc gluconate 50 mg, one daily    Should patient be taking these medication?   If patient is to continue these medications, will Dr. Wu send them to [facility] in [place], [place]?  Phone number is [phone].     Hodan, patient's daughter is asking when patient needs to see a doctor again?     Date of last appointment with primary care: 6/14/19  Okay to leave a detailed message: Yes

## 2021-06-01 NOTE — TELEPHONE ENCOUNTER
The Vitamin D 1000 units daily is a good idea to continue.    The zinc would be suggested (or more easily a multivitamin such as Centrum Silver) if dietary input of fruits and veggies is poor.    Omeprazole 20 mg twice daily (OTC) could be substituted for the protonix until the pt establishes with a new physician.

## 2021-06-02 VITALS — WEIGHT: 104.2 LBS | HEIGHT: 62 IN | BODY MASS INDEX: 19.17 KG/M2

## 2021-06-02 VITALS — BODY MASS INDEX: 19.36 KG/M2 | WEIGHT: 105.2 LBS | HEIGHT: 62 IN

## 2021-06-03 VITALS — WEIGHT: 89.6 LBS | BODY MASS INDEX: 16.39 KG/M2

## 2021-06-13 NOTE — PROGRESS NOTES
"This is a 89 y.o. woman who comes in for  continued follow-up of her right breast cancer.  She is now 9 years  status post right partial mastectomy without radiation.  She then recurred 4 years ago and underwent a lumpectomy with radiation.  She is doing okay.  She had a stroke after I saw her last year.  She is also very bothered by her rheumatoid arthritis.      Please see the chart review for PMH, Meds, allergies, FH and SH.    ROS:  A 12 point comprehensive review of systems was negative except as noted.      Physical Exam:  Pulse 92  Ht 5' 2\" (1.575 m)  Wt 115 lb (52.2 kg)  BMI 21.03 kg/m2  General appearance: alert, appears stated age and cooperative  Breasts: There are no palpable masses. There are minimal radiation changes. The scar has healed up well.  Lymph nodes: Cervical, supraclavicular, and axillary nodes normal.  Neurologic: Grossly normal    Data Review: Reviewed her current mammograms. No evidence of disease.      Impression: Personal History of breast cancer. NOD.  From standpoint of cancer she is doing well.  She is otherwise somewhat depressed because her daughter does not live in the area.  Her rheumatoid arthritis is also getting more and more debilitating.    Plan: Follow up in 1 year with bilateral mammograms.    "

## 2021-06-16 PROBLEM — I10 HYPERTENSION: Status: ACTIVE | Noted: 2018-09-13

## 2021-06-16 PROBLEM — I35.0 AORTIC STENOSIS, MODERATE: Status: ACTIVE | Noted: 2018-11-19

## 2021-06-16 PROBLEM — D64.9 ANEMIA: Status: ACTIVE | Noted: 2018-09-13

## 2021-06-16 PROBLEM — K25.9 GASTRIC ULCER: Status: ACTIVE | Noted: 2018-09-13

## 2021-06-16 PROBLEM — N18.30 CKD (CHRONIC KIDNEY DISEASE) STAGE 3, GFR 30-59 ML/MIN (H): Status: ACTIVE | Noted: 2019-06-14

## 2021-06-17 NOTE — PROGRESS NOTES
"DIAGNOSIS:  1. Stroke  Basic Metabolic Panel   2. Heart murmur  Echo Complete       PLAN:  Patient Instructions   Declines immunizations    Check BMP (non-fasting)    Continue yearly mammograms    Schedule Echocardiogram            HPI:  In for a medcheck.  No recent lightheadedness.  No cp or sob.          Current Outpatient Prescriptions on File Prior to Visit   Medication Sig Dispense Refill     aspirin 81 MG EC tablet TAKE 1 TABLET (81 MG TOTAL) BY MOUTH DAILY. 90 tablet 1     CHOLECALCIFEROL 1,000 unit tablet TAKE 1 TABLET BY MOUTH DAILY. 90 tablet 1     dorzolamide-timolol (COSOPT) 22.3-6.8 mg/mL ophthalmic solution Administer 1 drop to both eyes 2 (two) times a day.       latanoprost (XALATAN) 0.005 % ophthalmic solution Administer 1 drop to both eyes bedtime.       lisinopril (PRINIVIL,ZESTRIL) 10 MG tablet TAKE 1 TABLET (10 MG TOTAL) BY MOUTH DAILY. 90 tablet 0     No current facility-administered medications on file prior to visit.        Pmh: reviewed  Psh: reviewed  Allergy:  reviewed      EXAM:    /64  Pulse 80  Resp 16  Ht 5' 2\" (1.575 m)  Wt 119 lb 4.8 oz (54.1 kg)  BMI 21.82 kg/m2  GEN:   ALERT, NAD, ORIENTED TIMES THREE  NECK: SUPPLE WITHOUT ADENOPATHY OR THYROMEGALY  LUNGS: CTA  COR: RRR WITH GRADE 2/6 CESAR RIGHT 2ND ICS   SKIN: NO RASH , ULCERS OR LESIONS NOTED  EXT: WITHOUT EDEMA OR SWELLING    No results found for this or any previous visit (from the past 168 hour(s)).       Results for orders placed or performed in visit on 11/01/16   Basic Metabolic Panel   Result Value Ref Range    Sodium 140 136 - 145 mmol/L    Potassium 4.3 3.5 - 5.0 mmol/L    Chloride 105 98 - 107 mmol/L    CO2 26 22 - 31 mmol/L    Anion Gap, Calculation 9 5 - 18 mmol/L    Glucose 121 70 - 125 mg/dL    Calcium 9.1 8.5 - 10.5 mg/dL    BUN 30 (H) 8 - 28 mg/dL    Creatinine 0.80 0.60 - 1.10 mg/dL    GFR MDRD Af Amer >60 >60 mL/min/1.73m2    GFR MDRD Non Af Amer >60 >60 mL/min/1.73m2     No results found for this or " any previous visit.

## 2021-06-18 NOTE — LETTER
Letter by Kain Wu MD at      Author: Kain Wu MD Service: -- Author Type: --    Filed:  Encounter Date: 2/1/2019 Status: (Other)       Ciarra Lundy  [id number] [address] [place] [id number]             February 1, 2019         Dear Ms. Lundy,    Below are the results from your recent visit:    Resulted Orders   XR Pelvis W 2 Vw Hips Bilateral    Narrative    [facility] RADIOLOGY    EXAM: XR PELVIS W 2 VW HIPS BILATERAL  LOCATION: [facility]  DATE/TIME: 2/1/2019 12:34 PM    INDICATION: Unspecified fall, initial encounter  COMPARISON: None.    FINDINGS: Negative hips. No fracture or dislocation.      Hi Ciarra:  The final report on your hip xrays showed no fracture.    Please call with questions or contact us using [employer].    Sincerely,        Electronically signed by Kain Wu MD

## 2021-06-20 NOTE — PROGRESS NOTES
[facility]    Facility:   [facility] [[id number]]   Code Status: FULL CODE      CHIEF COMPLAINT/REASON FOR VISIT:  Chief Complaint   Patient presents with     Follow Up     rehab, tjm, gib       HISTORY:      HPI: Ciarra is a 89 y.o. female who I had a chance to revisit with secondary to her hospitalization  - September 10, 2018 secondary to anemia associated with acute blood loss secondary to ulcer at the GE junction.  She did receive transfusion 2 units of packed red blood cells.  She currently has been normotensive and afebrile and also on room air.  She does have a hemoglobin scheduled for today.  She is on pantoprazole 40 mg twice daily also getting a mighty shake twice daily.  Also on a couple vitamins and minerals.  Waiting the hemoglobin scheduled for today.  Does have generalized weakness.  Denies melena.  Is able to work with therapy.  She wants more room independence but is able to use her front wheel walker.  She has does state again that she is almost at 100% and would hope to soon get a meeting to have a discharge to home.    Past Medical History:   Diagnosis Date     Acute ischemic stroke (H)      Anemia associated with acute blood loss      Breast cancer (H)      Family history of malignant hyperthermia      Glaucoma      HTN (hypertension)      Melena      Upper GI bleeding              Family History   Problem Relation Age of Onset     Cancer Mother       age 65, intra-abdominal cancer      Other Father       age 75, surgery for tumor  from blood clot      Breast cancer Grandchild      Social History     Social History     Marital status:      Spouse name: N/A     Number of children: N/A     Years of education: N/A     Social History Main Topics     Smoking status: Former Smoker     Smokeless tobacco: Never Used     Alcohol use No     Drug use: No     Sexual activity: Not on file     Other Topics Concern     Not on file      Social History Narrative         Review of Systems  Currently denies chills and fever coughing wheezing chest pain dizziness vertigo nausea vomiting diarrhea or flulike symptoms headache stiff neck myalgias or arthralgias.  History of breast cancer ischemic stroke hypertension and recent ulcer at the GE junction.       Current Outpatient Prescriptions   Medication Sig     acetaminophen (TYLENOL) 325 MG tablet Take 650 mg by mouth every 6 (six) hours as needed for pain.     aspirin 81 MG EC tablet TAKE 1 TABLET (81 MG TOTAL) BY MOUTH DAILY.     CHOLECALCIFEROL 1,000 unit tablet TAKE 1 TABLET BY MOUTH DAILY.     dorzolamide-timolol (COSOPT) 22.3-6.8 mg/mL ophthalmic solution Administer 1 drop to both eyes 2 (two) times a day.     latanoprost (XALATAN) 0.005 % ophthalmic solution Administer 1 drop to both eyes bedtime.     lisinopril (PRINIVIL,ZESTRIL) 10 MG tablet Take 1 tablet (10 mg total) by mouth daily.     pantoprazole (PROTONIX) 40 MG tablet Take 40 mg by mouth 2 (two) times a day before meals.       .There were no vitals filed for this visit.  Blood pressure 120/83 pulse 81 respirations 16 temperature 97.9 saturation room air 97%  Physical Exam   Constitutional: No distress.   HENT: .   Eyes: Pupils are equal, round, and reactive to light.   Neck: Neck supple. No thyromegaly present.   Cardiovascular: Normal rate, regular rhythm and normal heart sounds.    3/6 CESAR   Pulmonary/Chest: Breath sounds normal.   Abdominal: Bowel sounds are normal. There is no tenderness. There is no guarding.   Musculoskeletal:   Kyphosis.  Hand arthritis.  Able to use a walker.  Improving with her strength and balance.  No pain.   Lymphadenopathy:     She has no cervical adenopathy.   Neurological: She is alert.   Skin: Skin is warm and dry. No rash noted.   Psychiatric: Her behavior is normal.    LABS:   Lab Results   Component Value Date    WBC 7.5 10/30/2013    HGB 10.1 (L) 09/13/2018    HCT 42.2 10/30/2013    MCV 86  10/30/2013     10/30/2013     Results for orders placed or performed in visit on 05/01/18   Basic Metabolic Panel   Result Value Ref Range    Sodium 140 136 - 145 mmol/L    Potassium 4.3 3.5 - 5.0 mmol/L    Chloride 106 98 - 107 mmol/L    CO2 23 22 - 31 mmol/L    Anion Gap, Calculation 11 5 - 18 mmol/L    Glucose 100 70 - 125 mg/dL    Calcium 9.4 8.5 - 10.5 mg/dL    BUN 24 8 - 28 mg/dL    Creatinine 0.90 0.60 - 1.10 mg/dL    GFR MDRD Af Amer >60 >60 mL/min/1.73m2    GFR MDRD Non Af Amer 59 (L) >60 mL/min/1.73m2           ASSESSMENT:      ICD-10-CM    1. Gastric ulcer K25.9    2. Anemia D64.9    3. Hypertension I10    4. General weakness R53.1        PLAN:    No further changes at this time except waiting for the hemoglobin scheduled for the day.  She is open have a meeting soon she would like to start considering a discharge within the near future.  She did not have any other questions.      Electronically signed by: Michael Duane Johnson, CNP

## 2021-06-20 NOTE — PROGRESS NOTES
[facility]    Facility:   [facility] [[id number]]   Code Status: FULL CODE      CHIEF COMPLAINT/REASON FOR VISIT:  Chief Complaint   Patient presents with     Follow Up     rehab, anemia       HISTORY:      HPI: Ciarra is a 89 y.o. female who was seen secondary to her hospitalization  - September 10, 2018 secondary to anemia associated with acute blood loss secondary to ulcer at the GE junction.  She did present to the emergency department after evaluation of melena.  Initial labs did show a lactate of 4.9 hemoglobin 7.8 INR 1.1 the fecal blood test was positive.  She was started on IV proton pump inhibitor and transfuse with 2 units of packed red blood cells and gastroenterology did perform an EGD on 2018 which did show the ulcer at the GE junction.  Biopsy was taken pathology report still pending.  Repeat the EGD in 8 weeks was recommended.  She also has a history of hypertension which is generally controlled.  She is on a proton pump inhibitor or pantoprazole 40 mg twice daily.  She currently is on the transitional care and try to improve her strength and overall conditioning.  She is feeling much better she actually states about 100% better and wants to be able to go back home and she was able to talk about her home as well as her  and 2 [place] dogs.  She is not having any belly pain.  She is getting a house nutrient supplement twice daily.  No Tylenol as needed.  She states her appetite to be good and does take eyedrops for glaucoma.    Past Medical History:   Diagnosis Date     Acute ischemic stroke (H)      Anemia associated with acute blood loss      Breast cancer (H)      Family history of malignant hyperthermia      Glaucoma      HTN (hypertension)      Melena      Upper GI bleeding              Family History   Problem Relation Age of Onset     Cancer Mother       age 65, intra-abdominal cancer      Other Father       age  75, surgery for tumor  from blood clot      Breast cancer Grandchild      Social History     Social History     Marital status:      Spouse name: N/A     Number of children: N/A     Years of education: N/A     Social History Main Topics     Smoking status: Former Smoker     Smokeless tobacco: Never Used     Alcohol use No     Drug use: No     Sexual activity: Not on file     Other Topics Concern     Not on file     Social History Narrative         Review of Systems  Currently denies chills and fever coughing wheezing chest pain dizziness vertigo nausea vomiting diarrhea or flulike symptoms headache stiff neck myalgias or arthralgias.  History of breast cancer ischemic stroke hypertension and recent ulcer at the GE junction.  Current Outpatient Prescriptions   Medication Sig     acetaminophen (TYLENOL) 325 MG tablet Take 650 mg by mouth every 6 (six) hours as needed for pain.     aspirin 81 MG EC tablet TAKE 1 TABLET (81 MG TOTAL) BY MOUTH DAILY.     CHOLECALCIFEROL 1,000 unit tablet TAKE 1 TABLET BY MOUTH DAILY.     dorzolamide-timolol (COSOPT) 22.3-6.8 mg/mL ophthalmic solution Administer 1 drop to both eyes 2 (two) times a day.     latanoprost (XALATAN) 0.005 % ophthalmic solution Administer 1 drop to both eyes bedtime.     lisinopril (PRINIVIL,ZESTRIL) 10 MG tablet Take 1 tablet (10 mg total) by mouth daily.     pantoprazole (PROTONIX) 40 MG tablet Take 40 mg by mouth 2 (two) times a day before meals.       .There were no vitals filed for this visit.  Blood pressure 103/71 pulse 77 respiration 16 temperature 97.8 saturation room air 96%  Physical Exam   Constitutional: No distress.   HENT:   Head: Normocephalic.   Eyes: Pupils are equal, round, and reactive to light.   Neck: Neck supple. No thyromegaly present.   Cardiovascular: Normal rate, regular rhythm and normal heart sounds.    3/6 CESAR   Pulmonary/Chest: Breath sounds normal.   Abdominal: Bowel sounds are normal. There is no tenderness. There is  no guarding.   Musculoskeletal:   Kyphosis.  Hand arthritis.  Able to use a walker.  Improving with her strength and balance.  No pain.   Lymphadenopathy:     She has no cervical adenopathy.   Neurological: She is alert.   Skin: Skin is warm and dry. No rash noted.   Psychiatric: Her behavior is normal.         LABS:   Lab Results   Component Value Date    WBC 7.5 10/30/2013    HGB 10.1 (L) 09/13/2018    HCT 42.2 10/30/2013    MCV 86 10/30/2013     10/30/2013     Results for orders placed or performed in visit on 05/01/18   Basic Metabolic Panel   Result Value Ref Range    Sodium 140 136 - 145 mmol/L    Potassium 4.3 3.5 - 5.0 mmol/L    Chloride 106 98 - 107 mmol/L    CO2 23 22 - 31 mmol/L    Anion Gap, Calculation 11 5 - 18 mmol/L    Glucose 100 70 - 125 mg/dL    Calcium 9.4 8.5 - 10.5 mg/dL    BUN 24 8 - 28 mg/dL    Creatinine 0.90 0.60 - 1.10 mg/dL    GFR MDRD Af Amer >60 >60 mL/min/1.73m2    GFR MDRD Non Af Amer 59 (L) >60 mL/min/1.73m2       No results found for: ALT, AST, GGT, ALKPHOS, BILITOT      ASSESSMENT:      ICD-10-CM    1. Anemia D64.9    2. General weakness R53.1    3. Hypertension I10    4. Gastric ulcer K25.9        PLAN:    At this time continue to manage and follow.  She is making pretty good progress overall.  She is not feeling like she needs to be here for very long period of time I do not know if there is been a care conference yet scheduled or not but she states that she is back to her normal self and she will try to get an appointment with therapy and  to discuss her overall plan of care.  Continue to manage and follow.      Electronically signed by: Michael Duane Johnson, CNP

## 2021-06-20 NOTE — PROGRESS NOTES
[facility]    Facility:   [facility] [[id number]]   Code Status: FULL CODE      CHIEF COMPLAINT/REASON FOR VISIT:  Chief Complaint   Patient presents with     Follow Up     rehab       HISTORY:      HPI: Ciarra is a 89 y.o. female who I had the pleasure to revisit with secondary to her hospitalization  - September 10, 2018 secondary anemia associated with acute blood loss secondary to ulcer at the GE junction.  She did receive 2 units packed red blood cells.  She results below her most recent hemoglobin.  Her energy level is improving.  Her appetite is good.  Getting extra nutrient supplements.  Getting eyedrops for glaucoma.  Is participating with the rehabilitation services.  She has been normotensive afebrile and also on room air.  She is feeling pretty good overall and does feel as if she could be able to go home soon but no date has yet been set.  No heartburn or reflux and is on pantoprazole 40 mg twice daily    Past Medical History:   Diagnosis Date     Acute ischemic stroke (H)      Anemia associated with acute blood loss      Breast cancer (H)      Family history of malignant hyperthermia      Glaucoma      HTN (hypertension)      Melena      Upper GI bleeding              Family History   Problem Relation Age of Onset     Cancer Mother       age 65, intra-abdominal cancer      Other Father       age 75, surgery for tumor  from blood clot      Breast cancer Grandchild      Social History     Social History     Marital status:      Spouse name: N/A     Number of children: N/A     Years of education: N/A     Social History Main Topics     Smoking status: Former Smoker     Smokeless tobacco: Never Used     Alcohol use No     Drug use: No     Sexual activity: Not on file     Other Topics Concern     Not on file     Social History Narrative         Review of Systems  Currently denies chills and fever coughing wheezing chest pain dizziness  vertigo nausea vomiting diarrhea or flulike symptoms headache stiff neck myalgias or arthralgias.  History of breast cancer ischemic stroke hypertension and recent ulcer at the GE junction.          Current Outpatient Prescriptions   Medication Sig     acetaminophen (TYLENOL) 325 MG tablet Take 650 mg by mouth every 6 (six) hours as needed for pain.     aspirin 81 MG EC tablet TAKE 1 TABLET (81 MG TOTAL) BY MOUTH DAILY.     CHOLECALCIFEROL 1,000 unit tablet TAKE 1 TABLET BY MOUTH DAILY.     dorzolamide-timolol (COSOPT) 22.3-6.8 mg/mL ophthalmic solution Administer 1 drop to both eyes 2 (two) times a day.     latanoprost (XALATAN) 0.005 % ophthalmic solution Administer 1 drop to both eyes bedtime.     lisinopril (PRINIVIL,ZESTRIL) 10 MG tablet Take 1 tablet (10 mg total) by mouth daily.     pantoprazole (PROTONIX) 40 MG tablet Take 40 mg by mouth 2 (two) times a day before meals.        .There were no vitals filed for this visit.  Blood pressure 117/84 pulse 72 respirations 12 temperature 97.9 saturation room air 95%  Physical Exam    Constitutional: No distress.   HENT: .   Neck: Neck supple. No thyromegaly present.   Cardiovascular: Normal rate, regular rhythm and normal heart sounds.    3/6 CESAR   Pulmonary/Chest: Breath sounds normal.   Abdominal: Bowel sounds are normal. There is no tenderness. There is no guarding.   Musculoskeletal:   Kyphosis.  Hand arthritis.  Able to use a walker.  Improving with her strength and balance.  No pain.   Lymphadenopathy:     She has no cervical adenopathy.   Neurological: She is alert.   Skin: Skin is warm and dry. No rash noted.   Psychiatric: Her behavior is normal.     LABS:   Lab Results   Component Value Date    WBC 7.5 10/30/2013    HGB 11.6 (L) 09/17/2018    HCT 42.2 10/30/2013    MCV 86 10/30/2013     10/30/2013     Results for orders placed or performed in visit on 05/01/18   Basic Metabolic Panel   Result Value Ref Range    Sodium 140 136 - 145 mmol/L     Potassium 4.3 3.5 - 5.0 mmol/L    Chloride 106 98 - 107 mmol/L    CO2 23 22 - 31 mmol/L    Anion Gap, Calculation 11 5 - 18 mmol/L    Glucose 100 70 - 125 mg/dL    Calcium 9.4 8.5 - 10.5 mg/dL    BUN 24 8 - 28 mg/dL    Creatinine 0.90 0.60 - 1.10 mg/dL    GFR MDRD Af Amer >60 >60 mL/min/1.73m2    GFR MDRD Non Af Amer 59 (L) >60 mL/min/1.73m2           ASSESSMENT:      ICD-10-CM    1. Anemia D64.9    2. Gastric ulcer K25.9    3. Hypertension I10    4. General weakness R53.1        PLAN:    The hemoglobin is improving.  She is feeling much better.  Her energy level is improving and also is participating with the rehabilitation process.  She did not have any other further questions.  Continue with therapy.        Electronically signed by: Michael Duane Johnson, CNP

## 2021-06-20 NOTE — PROGRESS NOTES
[facility]    Facility:   [facility] [[id number]]   Code Status: FULL CODE  PCP: Kain Wu MD   Phone: [phone]   Fax: [phone]      CHIEF COMPLAINT/REASON FOR VISIT:  Chief Complaint   Patient presents with     Discharge Summary       HISTORY COURSE:   Ciarra is a 89 y.o. female who was seen secondary to her hospitalization September 7 - September 10, 2018 secondary to anemia associated with acute blood loss secondary to ulcer at the GE junction.  She did present to the emergency department after evaluation of melena.  Initial labs did show a lactate of 4.9 hemoglobin 7.8 INR 1.1 the fecal blood test was positive.  She was started on IV proton pump inhibitor and transfuse with 2 units of packed red blood cells and gastroenterology did perform an EGD on September 8, 2018 which did show the ulcer at the GE junction.  Biopsy was taken pathology report still pending.  Repeat the EGD in 8 weeks was recommended.  She also has a history of hypertension which is generally controlled.  She is on a proton pump inhibitor or pantoprazole 40 mg twice daily.  She has been able to successfully participate with the rehabilitation services and currently using a front wheel walker.  Her appetite is good she is getting a mighty shake twice daily for extra proteins and nutrients.  For her chronic pain she does take a Tylenol usually about 1 per day.  She otherwise has been asymptomatic regarding her gastric ulcer including no noticed hematochezia.  She did also has a history of urticaria occasionally will take a Benadryl as needed.  She has been normotensive afebrile and also on room air.  She is excited upon going home and also had a chance to discuss the therapy process as well as getting some home cares as well including physical and occupational therapy.  Also needs to repeat the EGD in 8 weeks from her hospitalization on September 7.    Review of Systems  Currently  denies chills and fever coughing wheezing chest pain dizziness vertigo nausea vomiting diarrhea or flulike symptoms headache stiff neck myalgias or arthralgias.  History of breast cancer ischemic stroke hypertension and recent ulcer at the GE junction.  Vitals:    09/24/18 0834   BP: 130/86   Pulse: 78   Resp: 18   Temp: 97.3  F (36.3  C)   SpO2: 95%       Physical Exam  Constitutional: No distress.   HENT: Neck: Neck supple. No thyromegaly present.   Cardiovascular: Normal rate, regular rhythm and normal heart sounds.    3/6 CESAR   Pulmonary/Chest: Breath sounds normal.   Abdominal: . There is no tenderness. There is no guarding.   Musculoskeletal:   Kyphosis.  Hand arthritis.  Able to use a walker.  Improving with her strength and balance.  No pain.   Lymphadenopathy:     She has no cervical adenopathy.   Neurological: She is alert.   Skin: Skin is warm and dry. No rash noted.   Psychiatric: Her behavior is normal.   Lab Results   Component Value Date    WBC 7.5 10/30/2013    HGB 11.6 (L) 09/17/2018    HCT 42.2 10/30/2013    MCV 86 10/30/2013     10/30/2013       Results for orders placed or performed in visit on 05/01/18   Basic Metabolic Panel   Result Value Ref Range    Sodium 140 136 - 145 mmol/L    Potassium 4.3 3.5 - 5.0 mmol/L    Chloride 106 98 - 107 mmol/L    CO2 23 22 - 31 mmol/L    Anion Gap, Calculation 11 5 - 18 mmol/L    Glucose 100 70 - 125 mg/dL    Calcium 9.4 8.5 - 10.5 mg/dL    BUN 24 8 - 28 mg/dL    Creatinine 0.90 0.60 - 1.10 mg/dL    GFR MDRD Af Amer >60 >60 mL/min/1.73m2    GFR MDRD Non Af Amer 59 (L) >60 mL/min/1.73m2       No results found for: ALT, AST, GGT, ALKPHOS, BILITOT    MEDICATION LIST:  Current Outpatient Prescriptions   Medication Sig     zinc gluconate 50 mg tablet Take 50 mg by mouth daily.     acetaminophen (TYLENOL) 325 MG tablet Take 650 mg by mouth every 6 (six) hours as needed for pain.     aspirin 81 MG EC tablet TAKE 1 TABLET (81 MG TOTAL) BY MOUTH DAILY.      CHOLECALCIFEROL 1,000 unit tablet TAKE 1 TABLET BY MOUTH DAILY.     dorzolamide-timolol (COSOPT) 22.3-6.8 mg/mL ophthalmic solution Administer 1 drop to both eyes 2 (two) times a day.     latanoprost (XALATAN) 0.005 % ophthalmic solution Administer 1 drop to both eyes bedtime.     lisinopril (PRINIVIL,ZESTRIL) 10 MG tablet Take 1 tablet (10 mg total) by mouth daily.     pantoprazole (PROTONIX) 40 MG tablet Take 40 mg by mouth 2 (two) times a day before meals.       DISCHARGE DIAGNOSIS:    ICD-10-CM    1. Anemia D64.9    2. Gastric ulcer K25.9    3. Hypertension I10    4. Glaucoma H40.9        MEDICAL EQUIPMENT NEEDS:  None    DISCHARGE PLAN/FACE TO FACE:  I certify that services are/were furnished while this patient was under the care of a physician and that a physician or an allowed non-physician practitioner (NPP), had a face-to-face encounter that meets the physician face-to-face encounter requirements. The encounter was in whole, or in part, related to the primary reason for home health. The patient is confined to his/her home and needs intermittent skilled nursing, physical therapy, speech-language pathology, or the continued need for occupational therapy. A plan of care has been established by a physician and is periodically reviewed by a physician.  Date of Face-to-Face Encounter: September 24, 2018    I certify that, based on my findings, the following services are medically necessary home health services: She will be discharging to home with current medications on September 26, 2018 will also receive physical and occupational therapy home health aide and nursing.  The home.  She has not yet been identified.    My clinical findings support the need for the above skilled services because: (Please write a brief narrative summary that describes what the RN, PT, SLP, or other services will be doing in the home. A list of diagnoses in this section does not meet the CMS requirements.)  She will require the various  services secondary to her most recent hospitalization for anemia along with generalized weakness debility home safety transfers and medication management.    This patient is homebound because: (Please write a brief narrative summary describing the functional limitations as to why this patient is homebound and specifically what makes this patient homebound.)  Secondary to multiple chronic medical conditions including recent hospitalization requiring assistance with basic ADLs home safety transfers and medication management    The patient is, or has been, under my care and I have initiated the establishment of the plan of care. This patient will be followed by a physician who will periodically review the plan of care.    Schedule follow up visit with primary care provider within 7 days to reestablish care.  She will need to schedule a repeat EGD in 8 weeks which would take her into November 2018.  She will also follow-up with her primary care doctor regarding medication management and any future laboratory studies.  She does feel comfortable managing her care while at home along with her medications eyedrops as well as to assess for any other issues regarding any bleed.  She is happy to be able to be discharged from the facility.  She had no further questions.    Discharge coordination care greater than 30 minutes  Electronically signed by: Michael Duane Johnson, CNP

## 2021-06-20 NOTE — PROGRESS NOTES
[facility]      Facility:    [facility] [[id number]]  Code Status: UNKNOWN      Chief Complaint/Reason for Visit:  Chief Complaint   Patient presents with     H & P     Anemia associated with acute blood loss anemia due to gastro-i esophageal ulcer, hypertension, elevated white count, urticaria, melena.       HPI:   Ciarra is a 89 y.o. female who was recently admitted to the hospital on 9/7/2018 she does have a history of breast cancer and acute ischemic stroke.  She was admitted in the emergency department for evaluation of melena.  She did have a pruritic rash initially on her back and was seen on August 20, 2018 had spread to her lower extremities she notes that the rash has since spread.  She states his rash is now primarily located on her chest and abdomen and she was on Tylenol for the last few weeks for back pain.  Was unable was unable to recall using Tylenol previously she was ultimately diagnosed with urticaria.  She was discharged on Zyrtec and prednisone twice daily for 1 week and she did advised to discontinue the Tylenol.  The prednisone made her feel somewhat queasy for 2 days nausea dry heaves and black colored diarrhea.  She did feel short short of breath when walking.  Workup was pretty much negative hemoglobin was 7.8.  She was and started she was started on IV proton pump inhibitor and transferred to unit of packed red blood cells.  GI saw patient underwent EGD which showed gastroesophageal junction ulcer biopsy was taken pathology is pending.  Hemoglobin was stable at discharge and she was treated appropriate and transferred here to the TCU at [facility] in stable condition.    Patient claims that her rash is gone and she has no signs of abdominal pain and she is doing well at this time.  She believes she is back to her baseline function has no other issues.    Past Medical History:  Past Medical History:   Diagnosis Date     Acute ischemic  stroke (H)      Anemia associated with acute blood loss      Breast cancer (H)      Family history of malignant hyperthermia      Glaucoma      HTN (hypertension)      Melena      Upper GI bleeding            Surgical History:  Past Surgical History:   Procedure Laterality Date     BREAST LUMPECTOMY Right 10/31/2013     BREAST LUMPECTOMY Right 2005     [place] EXCISE BREAST CYST      Description: Breast Surgery Lumpectomy;  Recorded: 2010;  Comments: RIGHT BREAST,       UPPER GASTROINTESTINAL ENDOSCOPY  2018       Family History:   Family History   Problem Relation Age of Onset     Cancer Mother       age 65, intra-abdominal cancer      Other Father       age 75, surgery for tumor  from blood clot      Breast cancer Grandchild        Social History:    Social History     Social History     Marital status:      Spouse name: N/A     Number of children: N/A     Years of education: N/A     Social History Main Topics     Smoking status: Former Smoker     Smokeless tobacco: Never Used     Alcohol use No     Drug use: No     Sexual activity: Not Asked     Other Topics Concern     None     Social History Narrative          Review of Systems   Constitutional:        Patient denies any pain fevers chills nausea vomiting diarrhea change in vision hearing taste or smell weakness one side of the chest pain shortness of breath.  She denies any incontinent of stool qualifiers ablative to polyuria depression anxiety and the remainder the review of systems negative.       Vitals:    18 1046   BP: 140/89   Pulse: 85   Resp: 16   Temp: 96.8  F (36  C)   SpO2: 95%       Physical Exam   Constitutional: No distress.   HENT:   Head: Normocephalic and atraumatic.   Eyes: Right eye exhibits no discharge. Left eye exhibits no discharge. No scleral icterus.   Neck: Neck supple.   Cardiovascular: Normal rate and regular rhythm.    Pulmonary/Chest: Effort normal and breath sounds normal. No respiratory  distress. She exhibits no tenderness.   Abdominal: Soft. Bowel sounds are normal. She exhibits no distension and no mass.   Musculoskeletal: She exhibits no edema or tenderness.   Lymphadenopathy:     She has no cervical adenopathy.   Skin: Skin is warm and dry. She is not diaphoretic.   Psychiatric: She has a normal mood and affect. Her behavior is normal.       Medication List:  Current Outpatient Prescriptions   Medication Sig     acetaminophen (TYLENOL) 325 MG tablet Take 650 mg by mouth every 6 (six) hours as needed for pain.     aspirin 81 MG EC tablet TAKE 1 TABLET (81 MG TOTAL) BY MOUTH DAILY.     CHOLECALCIFEROL 1,000 unit tablet TAKE 1 TABLET BY MOUTH DAILY.     dorzolamide-timolol (COSOPT) 22.3-6.8 mg/mL ophthalmic solution Administer 1 drop to both eyes 2 (two) times a day.     latanoprost (XALATAN) 0.005 % ophthalmic solution Administer 1 drop to both eyes bedtime.     lisinopril (PRINIVIL,ZESTRIL) 10 MG tablet Take 1 tablet (10 mg total) by mouth daily.     pantoprazole (PROTONIX) 40 MG tablet Take 40 mg by mouth 2 (two) times a day before meals.       Labs: Hospital labs are as follows on 7/8/2018 sodium is 143 potassium 4.2 hemoglobin was CO2 was 20, calcium 7.6, BUN 50, creatinine 0.79, GFR is greater than 60.  Hemoglobin was 11.5, 10.3, 9.5, 10.1, and on 9/7/2018 was 7.8.      Assessment:    ICD-10-CM    1. Upper GI bleed K92.2    2. Anemia D64.9    3. Leukocytosis D72.829    4. Hypertension I10        Plan: Plan at this time will check a white count we will check a hemoglobin.  Given that she does have a deformities in of her hands in flexion she has had for some time I will not do any further workup and defer to primary care physician.  I will continue to monitor above medical problems and no other changes to care plan at this time.  Care plan reviewed and is appropriate.        Electronically signed by: Kain Damico DO

## 2021-06-21 NOTE — PROGRESS NOTES
Preoperative Exam    Scheduled Procedure: Right eye Cataract Extraction and lens implan t  Surgery Date:  12/12/2018   Surgery Location: [facility] [place]    Surgeon:  Dr. Menjivar    Assessment/Plan:     1. Pre-operative examination      2. Cataract of right eye, unspecified cataract type      3. Essential hypertension      4. Glaucoma, unspecified glaucoma type, unspecified laterality      5. Cerebrovascular accident (CVA), unspecified mechanism (H)      6. Anemia, due to bleeding ulcer at [facility] for which she was hospitalized 9-7 to 9-10-18.  Did receive two units of blood.    7.  Aortic Stenosis, moderate      Surgical Procedure Risk: Low (reported cardiac risk generally < 1%)  Have you had prior anesthesia?: Yes   Have you or any family members had a previous anesthesia reaction:  No  Do you or any family members have a history of a clotting or bleeding disorder?: No  Cardiac Risk Assessment: no increased risk for major cardiac complications    Patient approved for surgery with local anesthesia.  Patient Instructions   Aspirin has been on hold since her gastric ulcer.   She plans to re-start aspirin in a couple weeks.  Make sure to take the aspirin with your breakfast in the morning when you re-start it.         Functional Status: Partially Dependent:   Patient plans to recover at home alone.     Subjective:      Ciarra Lundy is a 89 y.o. female who presents for a preoperative consultation.      All other systems reviewed and are negative, other than those listed in the HPI.    Pertinent History  Do you have difficulty breathing or chest pain after walking up a flight of stairs: avoids stairs in order to avoid falls but no sob or chest pain when she has done the stairs  History of obstructive sleep apnea: No  Steroid use in the last 6 months: No  Frequent Aspirin/NSAID use: Yes: off aspirin since bleeding ulcer  Prior Blood Transfusion: Yes: s/p 2 units blood in the hosptial  Prior Blood  Transfusion Reaction: No  If for some reason prior to, during or after the procedure, if it is medically indicated, would you be willing to have a blood transfusion?:  There is no transfusion refusal.    Current Outpatient Medications   Medication Sig Dispense Refill     aspirin 81 MG EC tablet TAKE 1 TABLET (81 MG TOTAL) BY MOUTH DAILY. 90 tablet 1     cholecalciferol, vitamin D3, (CHOLECALCIFEROL) 1,000 unit tablet Take 1 tablet (1,000 Units total) by mouth daily. 90 tablet 1     diphenhydrAMINE (BENADRYL) 25 mg capsule Take 25 mg by mouth every 6 (six) hours as needed for itching.       dorzolamide-timolol (COSOPT) 22.3-6.8 mg/mL ophthalmic solution Administer 1 drop to both eyes 2 (two) times a day.       latanoprost (XALATAN) 0.005 % ophthalmic solution Administer 1 drop to both eyes bedtime.       lisinopril (PRINIVIL,ZESTRIL) 10 MG tablet Take 1 tablet (10 mg total) by mouth daily. 90 tablet 3     pantoprazole (PROTONIX) 40 MG tablet Take 40 mg by mouth 2 (two) times a day before meals.       zinc gluconate 50 mg tablet Take 1 tablet (50 mg total) by mouth daily.  0     ZINC ORAL Take 1 tablet by mouth.       No current facility-administered medications for this visit.         Allergies   Allergen Reactions     Aspirin Nausea Only       Patient Active Problem List   Diagnosis     Glaucoma     Breast Neoplasm     Stroke (H)     Anemia     Hypertension     Gastric ulcer     Aortic stenosis, moderate       Past Medical History:   Diagnosis Date     Acute ischemic stroke (H)      Anemia associated with acute blood loss      Breast cancer (H)      Family history of malignant hyperthermia      Glaucoma      HTN (hypertension)      Melena      Upper GI bleeding        Past Surgical History:   Procedure Laterality Date     BREAST LUMPECTOMY Right 10/31/2013     BREAST LUMPECTOMY Right 05/26/2005     [place] EXCISE BREAST CYST      Description: Breast Surgery Lumpectomy;  Recorded: 07/27/2010;  Comments: RIGHT BREAST,   "2005     UPPER GASTROINTESTINAL ENDOSCOPY  09/08/2018       Social History     Socioeconomic History     Marital status:      Spouse name: Not on file     Number of children: Not on file     Years of education: Not on file     Highest education level: Not on file   Social Needs     Financial resource strain: Not on file     Food insecurity - worry: Not on file     Food insecurity - inability: Not on file     Transportation needs - medical: Not on file     Transportation needs - non-medical: Not on file   Occupational History     Not on file   Tobacco Use     Smoking status: Former Smoker     Smokeless tobacco: Never Used   Substance and Sexual Activity     Alcohol use: No     Drug use: No     Sexual activity: Not on file   Other Topics Concern     Not on file   Social History Narrative     Not on file       Patient Care Team:  Kain Wu MD as PCP - General (Family Medicine)   Dr Menjivar, for upcoming eye surgery          Objective:     Vitals:    11/19/18 1104   BP: 136/84   Pulse: 76   Resp: 18   SpO2: 95%   Weight: 105 lb 3.2 oz (47.7 kg)   Height: 5' 2\" (1.575 m)         Physical Exam:    GEN:  ALERT, ORIENTED TIMES THREE, NO APPARENT DISTRESS  HEENT:  TM'S NL                  PERRL                  THROAT CLEAR  NECK: SUPPLE WITHOUT ADENOPATHY, THYROMEGALY OR CAROTID BRUIT  LUNGS:  CTA  COR:  RRR WITH A GRADE 3 /6 CESAR LSB RADIATING TO THE 2ND RIGHT ICS  ABDOMEN: SOFT, POSITIVE BOWEL SOUNDS, NONTX WITHOUT MASS  EXT: NO CYANOSIS, CLUBBING OR EDEMA  SKIN:  NO ATYPICAL APPEARING SKIN LESIONS        Patient Instructions   Aspirin has been on hold since her gastric ulcer.   She plans to re-start aspirin in a couple weeks.  Make sure to take the aspirin with your breakfast in the morning when you re-start it.      Labs:  Lab Results   Component Value Date    WBC 11.5 (H) 10/12/2018    HGB 12.4 10/12/2018    HCT 37.3 10/12/2018    MCV 83 10/12/2018     10/12/2018     Echo Complete   Order# " [id number]   Reading physician: Lokesh Acosta MD Ordering physician: Kain Wu MD Study date: 18   Patient Information     Patient Name  Ciarra Lundy MRN  [id number] Sex  Female              Age  1926 (89 y.o.)   Indications     Dx: Heart murmur [R01.1 (ICD-10-CM)]   Summary       The calculated left ventricular ejection fraction is 75%.    Moderate calcific aortic stenosis.    Moderate tricuspid valve regurgitation. No pulmonary hypertension is present.    Moderate mitral regurgitation and mild calcific stenosis.    No previous study for comparison.              There is no immunization history on file for this patient.        Electronically signed by Kain Wu MD 18 11:08 AM

## 2021-06-21 NOTE — PROGRESS NOTES
Preoperative Exam    Scheduled Procedure: GI scope  Surgery Date:  10/18/18  Surgery Location: [place]    Surgeon:  Dr. Martinez    Assessment/Plan:     1. Encounter for preoperative examination for general surgical procedure  2. Cataract  3. GI bleed  Reviewed [facility] hospitalization in September 2018.  Patient tolerated upper GI endoscopy on 9/8/2018 at [facility] without any complication, and this endoscopy showed an ulcer at the GE junction she needs a repeat EGD which is scheduled for next week 10/18/2018 he has been on Protonix twice daily since hospital discharge and has been feeling well without any melena or any other symptoms..  She does have a history of a stroke which does increase her perioperative risk.  EKG unremarkable today except LVH.  Lab work today including CBC and CMP are pending.  She has no cardiac or respiratory symptoms.  She is optimized for surgery.  She is cleared for surgery.  - Comprehensive Metabolic Panel  - HM1(CBC and Differential)  - HM1 (CBC with Diff)  - Electrocardiogram Perform and Read      4. Hypertension  Well-controlled today.  Hold lisinopril on day of procedure    5. Stroke (H)  She has been on aspirin.  Doing well on therapy.  Hold ASA 5 days prior to procedure      Surgical Procedure Risk: Low (reported cardiac risk generally < 1%)  Have you had prior anesthesia?: Yes  Have you or any family members had a previous anesthesia reaction:  No  Do you or any family members have a history of a clotting or bleeding disorder?: Yes: was told she has lost too much blood  Cardiac Risk Assessment: increased risk for major cardiac complications based on  cerebrovascular disease    Patient approved for surgery with general or local anesthesia.     Please Note:  no special consideration    Functional Status: Partially Dependent: has help at home  Patient plans to recover at home alone.     Subjective:      Ciarra Lundy is a 89 y.o. female who presents for a  preoperative consultation.  She will be undergoing the upper GI endoscopy for follow-up of an upper GI bleeding with melena secondary to an ulceration at the GE junction, requiring hospitalization at [place] in 9/2018. She's no longer having black stool.  She did get an upper GI endoscopy on 9/8/2018 at [facility] and was tolerating it just fine.  She denies chest pain, shortness of breath, abdominal pain, fever, chills, nausea, vomiting, diarrhea, urinary symptoms.  So she has been having hard time reading.  She would like to pursue cataract surgery but this has not been scheduled yet    All other systems reviewed and are negative, other than those listed in the HPI.    Pertinent History  Do you have difficulty breathing or chest pain after walking up a flight of stairs: doesn't walk up stairs  History of obstructive sleep apnea: No  Steroid use in the last 6 months: No  Frequent Aspirin/NSAID use: Yes: daily baby aspirin  Prior Blood Transfusion: No  Prior Blood Transfusion Reaction: No  If for some reason prior to, during or after the procedure, if it is medically indicated, would you be willing to have a blood transfusion?:  There is no transfusion refusal.    Current Outpatient Prescriptions   Medication Sig Dispense Refill     acetaminophen (TYLENOL) 325 MG tablet Take 650 mg by mouth every 6 (six) hours as needed for pain.       aspirin 81 MG EC tablet TAKE 1 TABLET (81 MG TOTAL) BY MOUTH DAILY. 90 tablet 1     CHOLECALCIFEROL 1,000 unit tablet TAKE 1 TABLET BY MOUTH DAILY. 90 tablet 1     dorzolamide-timolol (COSOPT) 22.3-6.8 mg/mL ophthalmic solution Administer 1 drop to both eyes 2 (two) times a day.       latanoprost (XALATAN) 0.005 % ophthalmic solution Administer 1 drop to both eyes bedtime.       lisinopril (PRINIVIL,ZESTRIL) 10 MG tablet Take 1 tablet (10 mg total) by mouth daily. 90 tablet 3     pantoprazole (PROTONIX) 40 MG tablet Take 40 mg by mouth 2 (two) times a day before meals.       zinc  "gluconate 50 mg tablet Take 50 mg by mouth daily.       No current facility-administered medications for this visit.         Allergies   Allergen Reactions     Aspirin Nausea Only       Patient Active Problem List   Diagnosis     Glaucoma     Breast Neoplasm     Stroke (H)     Anemia     Hypertension     Gastric ulcer       Past Medical History:   Diagnosis Date     Acute ischemic stroke (H)      Anemia associated with acute blood loss      Breast cancer (H)      Family history of malignant hyperthermia      Glaucoma      HTN (hypertension)      Melena      Upper GI bleeding        Past Surgical History:   Procedure Laterality Date     BREAST LUMPECTOMY Right 10/31/2013     BREAST LUMPECTOMY Right 05/26/2005     [facility] EXCISE BREAST CYST      Description: Breast Surgery Lumpectomy;  Recorded: 07/27/2010;  Comments: RIGHT BREAST,  2005     UPPER GASTROINTESTINAL ENDOSCOPY  09/08/2018       Social History     Social History     Marital status:      Spouse name: N/A     Number of children: N/A     Years of education: N/A     Occupational History     Not on file.     Social History Main Topics     Smoking status: Former Smoker     Smokeless tobacco: Never Used     Alcohol use No     Drug use: No     Sexual activity: Not on file     Other Topics Concern     Not on file     Social History Narrative       Patient Care Team:  Kain Wu MD as PCP - General (Family Medicine)      Objective:     Vitals:    10/12/18 1501   BP: 110/78   Pulse: 95   Temp: 96.8  F (36  C)   TempSrc: Oral   SpO2: 96%   Weight: 104 lb 3.2 oz (47.3 kg)   Height: 5' 2\" (1.575 m)     Physical Exam:  General Appearance: Alert, cooperative, no distress, appears stated age  Head: Normocephalic, without obvious abnormality, atraumatic  Eyes: PERRL, conjunctiva/corneas clear, EOM's intact  Ears: Normal TM's and external ear canals, both ears  Nose: Nares normal, septum midline,mucosa normal, no drainage  Throat: Lips, mucosa, and tongue " normal; teeth and gums normal  Neck: Supple, symmetrical, trachea midline, no adenopathy;  thyroid: not enlarged, symmetric, no tenderness/mass/nodules.  Back: Symmetric, no curvature, ROM normal  Lungs: Clear to auscultation bilaterally, respirations unlabored  Heart: Regular rate and rhythm, S1 and S2 normal, no murmur rub or gallop  Abdomen: Soft, non-tender, bowel sounds active all four quadrants,  no masses, no organomegaly  Genitourinary: Not performed  Musculoskeletal: Evidence of arthritis on her hands.  Extremities: Extremities normal, atraumatic, no cyanosis,   Skin: Limited skin examination is unremarkable  Lymph nodes: Cervical, supraclavicular, and axillary nodes normal  Neurologic: alert, has normal reflexes.  answers questions appropriately, sensation intact throughout.   Psychiatric: normal mood and affect.       Patient Instructions     Hold all supplements, aspirin and NSAIDs for 7 days prior to surgery.  Follow your surgeon's direction on when to stop eating and drinking prior to surgery.  Your surgeon will be managing your pain after your surgery.        EKG:  NSR normal axis, LVH, no ST/ abnormality, no Q wave pathology, pending cardiology review.     Labs:  Labs pending at this time.  Results will be reviewed when available.      There is no immunization history on file for this patient.        Electronically signed by Anay Zimmer MD 10/12/18 2:49 PM

## 2021-06-23 NOTE — TELEPHONE ENCOUNTER
Request for Orders    Who s Requesting: Home Care Occupational Therapist   OT evaluation completed on: 2/6/19    Orders being requested:  OT to address ADLs/IADLs, monitor cognition, energy conservation, ROM, strengthening, and AE as needed    Frequency: 1W1; 2W4    A positive response in Epic will be taken as a verbal order.     Thank you,  Lisa Faith, OTR  [phone]

## 2021-06-23 NOTE — PATIENT INSTRUCTIONS - HE
reduce lisinopril to 5 mg daily    Recommend homecare eval and home pt    Home care eval and need for home PT    Also would recommend 24 HOUR PCA at home

## 2021-06-23 NOTE — PROGRESS NOTES
DIAGNOSIS:  1. Fall, initial encounter  Ambulatory referral to Home Health    XR Pelvis W 2 Vw Hips Bilateral   2. Stroke (H)  lisinopril (PRINIVIL,ZESTRIL) 10 MG tablet    Ambulatory referral to Home Health       PLAN:  Patient Instructions   reduce lisinopril to 5 mg daily    Recommend homecare eval and home pt    Home care eval and need for home PT    Also would recommend 24 HOUR PCA at home                HPI:   Fell on 1-20-19 on 1:20am while bending to  something off the floor.  Standing with her walker and fell. Hit the wall with her right hand.   Slid down the wall to the floor.  So was on the floor from 1:30am to 8:30am.   Had taken off alert bracelet so was unable to summon help.  Lives on one level in her home and never goes upstairs.  Daughter has been with her since the fall.  No pain any place.  Not lightheaded or dizzy.  States no pain on feet but doesn't feel steady.  A friend is here with her today and has been staying with her and providing care 24 hour.          Current Outpatient Medications on File Prior to Visit   Medication Sig Dispense Refill     cholecalciferol, vitamin D3, (CHOLECALCIFEROL) 1,000 unit tablet Take 1 tablet (1,000 Units total) by mouth daily. 90 tablet 1     methyl salicylate-menthol (MEGAN ELIZALDE GREASELESS) 15-10 % Crea Apply once daily as needed to sore muscles of the back.  0     pantoprazole (PROTONIX) 40 MG tablet Take 40 mg by mouth 2 (two) times a day before meals.       ZINC ORAL Take 1 tablet by mouth.       [DISCONTINUED] lisinopril (PRINIVIL,ZESTRIL) 10 MG tablet Take 1 tablet (10 mg total) by mouth daily. 90 tablet 3     aspirin 81 MG EC tablet Take 1 tablet (81 mg total) by mouth daily. 90 tablet 1     diphenhydrAMINE (BENADRYL) 25 mg capsule Take 25 mg by mouth every 6 (six) hours as needed for itching.       dorzolamide-timolol (COSOPT) 22.3-6.8 mg/mL ophthalmic solution Administer 1 drop to both eyes 2 (two) times a day.       latanoprost (XALATAN) 0.005 %  ophthalmic solution Administer 1 drop to both eyes bedtime.       zinc gluconate 50 mg tablet Take 1 tablet (50 mg total) by mouth daily.  0     No current facility-administered medications on file prior to visit.        Pmh: reviewed  Psh: reviewed  Allergy:  reviewed      EXAM:    /78   Pulse 62   Temp 96.7  F (35.9  C) (Oral)   GEN:   ALERT, NAD, ORIENTED TIMES THREE  LUNGS: CTA  COR: RRR WITHOUT MURMUR  SKIN: NO RASH , ULCERS OR LESIONS NOTED  MS: full ROM of the hips without any pain elicited.  Also no hip tx.   EXT: WITHOUT EDEMA OR SWELLING    No results found for this or any previous visit (from the past 168 hour(s)).       Results for orders placed or performed in visit on 10/29/18   Basic Metabolic Panel   Result Value Ref Range    Sodium 142 136 - 145 mmol/L    Potassium 4.2 3.5 - 5.0 mmol/L    Chloride 104 98 - 107 mmol/L    CO2 24 22 - 31 mmol/L    Anion Gap, Calculation 14 5 - 18 mmol/L    Glucose 88 70 - 125 mg/dL    Calcium 10.1 8.5 - 10.5 mg/dL    BUN 28 8 - 28 mg/dL    Creatinine 1.31 (H) 0.60 - 1.10 mg/dL    GFR MDRD Af Amer 46 (L) >60 mL/min/1.73m2    GFR MDRD Non Af Amer 38 (L) >60 mL/min/1.73m2     Xray negative for fracture of the hip

## 2021-06-23 NOTE — TELEPHONE ENCOUNTER
Dr Wu,    [facility] received a referral for this patient to be seen by SN and PT within 24 hours of the order date. However, due to our capacity we will not be able to accomodate this time frame. Just wondering if we would be able to extend the referral from 24 hours to 48 instead.    Simply respond to this Epic Telephone Call message string, and we can take as a verbal order if appropriate.   Thank you.

## 2021-06-24 NOTE — TELEPHONE ENCOUNTER
Request for Orders    Who s Requesting: Home Care Occupational Therapist    Orders being requested: Orders for MSW for eval and 1 PRN order for long term planning and resources.     A positive response in Epic will be taken as a verbal order.     Thank you,  Lisa Faith, OTR  [phone]

## 2021-06-24 NOTE — TELEPHONE ENCOUNTER
Request for Orders    PT Evaluation Completed 2/15/2019.    Who s Requesting: Home Care Physical Therapist    Orders being requested:   - Continued PT 3 visit every 1 week for 2 weeks, 2 visits every 1 week for 2 weeks for gait and transfer training, strengthening and balance exercises, monitoring O2    Where to send Orders:   Simply respond to this Epic Telephone Call message string, and we can take as a verbal order if appropriate. Otherwise, you may call Terri Ness, PT at [phone].  It is ok to leave a detailed message.  Thank you.

## 2021-06-24 NOTE — TELEPHONE ENCOUNTER
Request for Orders    Who s Requesting: Home Care Occupational Therapist     Orders being requested: OT to continue to address cognition as needed, ADLs/IADLs, AE as needed, and caregiver education.     Frequency: 2W2: 1W1    A positive response in Epic will be taken as a verbal order.     Thank you,  Lisa Faith, OTR  [phone]

## 2021-06-25 NOTE — TELEPHONE ENCOUNTER
Requesting order for 1  Social Work visit for a care conference today with pt, daughter and home care team for dc planning purposes.    Please respond to this message in Epic and we will put order in.     Thank you

## 2021-07-03 NOTE — ADDENDUM NOTE
Addendum Note by Anay Zimmer MD at 10/12/2018  4:51 PM     Author: Anay Zimmer MD Service: -- Author Type: Physician    Filed: 10/12/2018  4:51 PM Encounter Date: 10/12/2018 Status: Signed    : Anay Zimmer MD (Physician)    Addended by: ANAY ZIMMER on: 10/12/2018 04:51 PM        Modules accepted: Orders

## 2021-07-03 NOTE — ADDENDUM NOTE
Addendum Note by Tana Fuentes MA at 5/1/2018 11:03 AM     Author: Tana Fuentes MA Service: -- Author Type: Certified Medical Assistant    Filed: 5/1/2018 11:03 AM Encounter Date: 5/1/2018 Status: Signed    : Tana Fuentes MA (Certified Medical Assistant)    Addended by: TANA FUENTES on: 5/1/2018 11:03 AM        Modules accepted: Orders

## 2021-07-03 NOTE — ADDENDUM NOTE
Addendum Note by Cata Hummel RN at 8/17/2017 11:05 AM     Author: Cata Hummel RN Service: -- Author Type: Registered Nurse    Filed: 8/17/2017 11:05 AM Encounter Date: 8/16/2017 Status: Signed    : Cata Hummel RN (Registered Nurse)    Addended by: CATA HUMMEL on: 8/17/2017 11:05 AM        Modules accepted: Orders

## 2021-08-22 ENCOUNTER — HEALTH MAINTENANCE LETTER (OUTPATIENT)
Age: 86
End: 2021-08-22

## 2021-10-16 ENCOUNTER — HEALTH MAINTENANCE LETTER (OUTPATIENT)
Age: 86
End: 2021-10-16

## 2022-10-01 ENCOUNTER — HEALTH MAINTENANCE LETTER (OUTPATIENT)
Age: 87
End: 2022-10-01

## 2023-10-15 ENCOUNTER — HEALTH MAINTENANCE LETTER (OUTPATIENT)
Age: 88
End: 2023-10-15